# Patient Record
Sex: FEMALE | Race: WHITE | Employment: UNEMPLOYED | URBAN - METROPOLITAN AREA
[De-identification: names, ages, dates, MRNs, and addresses within clinical notes are randomized per-mention and may not be internally consistent; named-entity substitution may affect disease eponyms.]

---

## 2020-09-10 ENCOUNTER — APPOINTMENT (OUTPATIENT)
Dept: CT IMAGING | Age: 80
DRG: 280 | End: 2020-09-10
Payer: MEDICARE

## 2020-09-10 ENCOUNTER — HOSPITAL ENCOUNTER (INPATIENT)
Age: 80
LOS: 3 days | Discharge: HOSPICE | DRG: 280 | End: 2020-09-13
Attending: EMERGENCY MEDICINE | Admitting: STUDENT IN AN ORGANIZED HEALTH CARE EDUCATION/TRAINING PROGRAM
Payer: MEDICARE

## 2020-09-10 ENCOUNTER — APPOINTMENT (OUTPATIENT)
Dept: GENERAL RADIOLOGY | Age: 80
DRG: 280 | End: 2020-09-10
Payer: MEDICARE

## 2020-09-10 PROBLEM — K75.81 NASH (NONALCOHOLIC STEATOHEPATITIS): Status: ACTIVE | Noted: 2020-09-10

## 2020-09-10 PROBLEM — C50.919 METASTATIC BREAST CANCER (HCC): Status: ACTIVE | Noted: 2020-09-10

## 2020-09-10 PROBLEM — I21.4 NSTEMI (NON-ST ELEVATED MYOCARDIAL INFARCTION) (HCC): Status: ACTIVE | Noted: 2020-09-10

## 2020-09-10 PROBLEM — T68.XXXA HYPOTHERMIA: Status: ACTIVE | Noted: 2020-09-10

## 2020-09-10 PROBLEM — G93.40 ENCEPHALOPATHY: Status: ACTIVE | Noted: 2020-09-10

## 2020-09-10 PROBLEM — R23.8 SKIN BREAKDOWN: Status: ACTIVE | Noted: 2020-09-10

## 2020-09-10 PROBLEM — I10 ESSENTIAL HYPERTENSION: Chronic | Status: ACTIVE | Noted: 2020-09-10

## 2020-09-10 LAB
A/G RATIO: 0.5 (ref 1.1–2.2)
ALBUMIN SERPL-MCNC: 2.1 G/DL (ref 3.4–5)
ALP BLD-CCNC: 134 U/L (ref 40–129)
ALT SERPL-CCNC: 18 U/L (ref 10–40)
ANION GAP SERPL CALCULATED.3IONS-SCNC: 5 MMOL/L (ref 3–16)
APTT: 25.4 SEC (ref 24.2–36.2)
AST SERPL-CCNC: 45 U/L (ref 15–37)
BASOPHILS ABSOLUTE: 0 K/UL (ref 0–0.2)
BASOPHILS RELATIVE PERCENT: 0.3 %
BILIRUB SERPL-MCNC: 0.6 MG/DL (ref 0–1)
BILIRUBIN URINE: NEGATIVE
BLOOD, URINE: ABNORMAL
BUN BLDV-MCNC: 15 MG/DL (ref 7–20)
CALCIUM SERPL-MCNC: 8.3 MG/DL (ref 8.3–10.6)
CHLORIDE BLD-SCNC: 104 MMOL/L (ref 99–110)
CHP ED QC CHECK: YES
CLARITY: CLEAR
CO2: 31 MMOL/L (ref 21–32)
COLOR: YELLOW
COMMENT UA: ABNORMAL
CREAT SERPL-MCNC: 0.5 MG/DL (ref 0.6–1.2)
EOSINOPHILS ABSOLUTE: 0.1 K/UL (ref 0–0.6)
EOSINOPHILS RELATIVE PERCENT: 2.4 %
EPITHELIAL CELLS, UA: ABNORMAL /HPF (ref 0–5)
GFR AFRICAN AMERICAN: >60
GFR NON-AFRICAN AMERICAN: >60
GLOBULIN: 4.2 G/DL
GLUCOSE BLD-MCNC: 85 MG/DL (ref 70–99)
GLUCOSE BLD-MCNC: 89 MG/DL
GLUCOSE BLD-MCNC: 89 MG/DL (ref 70–99)
GLUCOSE URINE: NEGATIVE MG/DL
HCT VFR BLD CALC: 34.7 % (ref 36–48)
HEMOGLOBIN: 11.7 G/DL (ref 12–16)
KETONES, URINE: NEGATIVE MG/DL
LACTIC ACID: 1.5 MMOL/L (ref 0.4–2)
LEUKOCYTE ESTERASE, URINE: NEGATIVE
LYMPHOCYTES ABSOLUTE: 1.4 K/UL (ref 1–5.1)
LYMPHOCYTES RELATIVE PERCENT: 25.6 %
MCH RBC QN AUTO: 35.7 PG (ref 26–34)
MCHC RBC AUTO-ENTMCNC: 33.8 G/DL (ref 31–36)
MCV RBC AUTO: 105.7 FL (ref 80–100)
MICROSCOPIC EXAMINATION: YES
MONOCYTES ABSOLUTE: 0.6 K/UL (ref 0–1.3)
MONOCYTES RELATIVE PERCENT: 11 %
NEUTROPHILS ABSOLUTE: 3.4 K/UL (ref 1.7–7.7)
NEUTROPHILS RELATIVE PERCENT: 60.7 %
NITRITE, URINE: NEGATIVE
PDW BLD-RTO: 16.4 % (ref 12.4–15.4)
PERFORMED ON: NORMAL
PH UA: 5 (ref 5–8)
PLATELET # BLD: ABNORMAL K/UL (ref 135–450)
PLATELET SLIDE REVIEW: ABNORMAL
PMV BLD AUTO: ABNORMAL FL (ref 5–10.5)
POTASSIUM REFLEX MAGNESIUM: 4.1 MMOL/L (ref 3.5–5.1)
PROTEIN UA: NEGATIVE MG/DL
RBC # BLD: 3.28 M/UL (ref 4–5.2)
RBC UA: ABNORMAL /HPF (ref 0–4)
SODIUM BLD-SCNC: 140 MMOL/L (ref 136–145)
SPECIFIC GRAVITY UA: 1.02 (ref 1–1.03)
TOTAL PROTEIN: 6.3 G/DL (ref 6.4–8.2)
TROPONIN: 0.23 NG/ML
URINE REFLEX TO CULTURE: ABNORMAL
URINE TYPE: ABNORMAL
UROBILINOGEN, URINE: 0.2 E.U./DL
WBC # BLD: 5.6 K/UL (ref 4–11)
WBC UA: ABNORMAL /HPF (ref 0–5)

## 2020-09-10 PROCEDURE — 70496 CT ANGIOGRAPHY HEAD: CPT

## 2020-09-10 PROCEDURE — 70450 CT HEAD/BRAIN W/O DYE: CPT

## 2020-09-10 PROCEDURE — 85730 THROMBOPLASTIN TIME PARTIAL: CPT

## 2020-09-10 PROCEDURE — 6360000002 HC RX W HCPCS: Performed by: EMERGENCY MEDICINE

## 2020-09-10 PROCEDURE — 96375 TX/PRO/DX INJ NEW DRUG ADDON: CPT

## 2020-09-10 PROCEDURE — 93005 ELECTROCARDIOGRAM TRACING: CPT | Performed by: EMERGENCY MEDICINE

## 2020-09-10 PROCEDURE — 6370000000 HC RX 637 (ALT 250 FOR IP): Performed by: EMERGENCY MEDICINE

## 2020-09-10 PROCEDURE — 83605 ASSAY OF LACTIC ACID: CPT

## 2020-09-10 PROCEDURE — 71045 X-RAY EXAM CHEST 1 VIEW: CPT

## 2020-09-10 PROCEDURE — 36415 COLL VENOUS BLD VENIPUNCTURE: CPT

## 2020-09-10 PROCEDURE — 96366 THER/PROPH/DIAG IV INF ADDON: CPT

## 2020-09-10 PROCEDURE — 96365 THER/PROPH/DIAG IV INF INIT: CPT

## 2020-09-10 PROCEDURE — 84443 ASSAY THYROID STIM HORMONE: CPT

## 2020-09-10 PROCEDURE — 96374 THER/PROPH/DIAG INJ IV PUSH: CPT

## 2020-09-10 PROCEDURE — 81001 URINALYSIS AUTO W/SCOPE: CPT

## 2020-09-10 PROCEDURE — 2500000003 HC RX 250 WO HCPCS: Performed by: EMERGENCY MEDICINE

## 2020-09-10 PROCEDURE — 80053 COMPREHEN METABOLIC PANEL: CPT

## 2020-09-10 PROCEDURE — 6360000004 HC RX CONTRAST MEDICATION: Performed by: EMERGENCY MEDICINE

## 2020-09-10 PROCEDURE — 2580000003 HC RX 258: Performed by: EMERGENCY MEDICINE

## 2020-09-10 PROCEDURE — 99285 EMERGENCY DEPT VISIT HI MDM: CPT

## 2020-09-10 PROCEDURE — 1200000000 HC SEMI PRIVATE

## 2020-09-10 PROCEDURE — 84484 ASSAY OF TROPONIN QUANT: CPT

## 2020-09-10 PROCEDURE — 82140 ASSAY OF AMMONIA: CPT

## 2020-09-10 PROCEDURE — 87040 BLOOD CULTURE FOR BACTERIA: CPT

## 2020-09-10 PROCEDURE — 85025 COMPLETE CBC W/AUTO DIFF WBC: CPT

## 2020-09-10 RX ORDER — SODIUM CHLORIDE 0.9 % (FLUSH) 0.9 %
10 SYRINGE (ML) INJECTION PRN
Status: DISCONTINUED | OUTPATIENT
Start: 2020-09-10 | End: 2020-09-13 | Stop reason: HOSPADM

## 2020-09-10 RX ORDER — HEPARIN SODIUM 1000 [USP'U]/ML
60 INJECTION, SOLUTION INTRAVENOUS; SUBCUTANEOUS PRN
Status: DISCONTINUED | OUTPATIENT
Start: 2020-09-10 | End: 2020-09-10 | Stop reason: ALTCHOICE

## 2020-09-10 RX ORDER — HEPARIN SODIUM 1000 [USP'U]/ML
4000 INJECTION, SOLUTION INTRAVENOUS; SUBCUTANEOUS ONCE
Status: COMPLETED | OUTPATIENT
Start: 2020-09-10 | End: 2020-09-10

## 2020-09-10 RX ORDER — FOLIC ACID 1 MG/1
1 TABLET ORAL DAILY
Status: ON HOLD | COMMUNITY
End: 2020-09-13 | Stop reason: HOSPADM

## 2020-09-10 RX ORDER — 0.9 % SODIUM CHLORIDE 0.9 %
500 INTRAVENOUS SOLUTION INTRAVENOUS ONCE
Status: COMPLETED | OUTPATIENT
Start: 2020-09-10 | End: 2020-09-10

## 2020-09-10 RX ORDER — ASPIRIN 81 MG/1
324 TABLET, CHEWABLE ORAL ONCE
Status: COMPLETED | OUTPATIENT
Start: 2020-09-10 | End: 2020-09-10

## 2020-09-10 RX ORDER — PROMETHAZINE HYDROCHLORIDE 25 MG/1
12.5 TABLET ORAL EVERY 6 HOURS PRN
Status: DISCONTINUED | OUTPATIENT
Start: 2020-09-10 | End: 2020-09-13 | Stop reason: HOSPADM

## 2020-09-10 RX ORDER — LOSARTAN POTASSIUM 25 MG/1
25 TABLET ORAL DAILY
Status: DISCONTINUED | OUTPATIENT
Start: 2020-09-11 | End: 2020-09-13 | Stop reason: HOSPADM

## 2020-09-10 RX ORDER — HYDROCODONE BITARTRATE AND ACETAMINOPHEN 5; 325 MG/1; MG/1
1 TABLET ORAL ONCE
Status: COMPLETED | OUTPATIENT
Start: 2020-09-10 | End: 2020-09-10

## 2020-09-10 RX ORDER — SODIUM CHLORIDE 0.9 % (FLUSH) 0.9 %
10 SYRINGE (ML) INJECTION EVERY 12 HOURS SCHEDULED
Status: DISCONTINUED | OUTPATIENT
Start: 2020-09-10 | End: 2020-09-13 | Stop reason: HOSPADM

## 2020-09-10 RX ORDER — METOPROLOL TARTRATE 50 MG/1
50 TABLET, FILM COATED ORAL 2 TIMES DAILY
Status: DISCONTINUED | OUTPATIENT
Start: 2020-09-10 | End: 2020-09-11

## 2020-09-10 RX ORDER — FUROSEMIDE 40 MG/1
40 TABLET ORAL DAILY
Status: DISCONTINUED | OUTPATIENT
Start: 2020-09-11 | End: 2020-09-13

## 2020-09-10 RX ORDER — ACETAMINOPHEN 650 MG/1
650 SUPPOSITORY RECTAL EVERY 6 HOURS PRN
Status: DISCONTINUED | OUTPATIENT
Start: 2020-09-10 | End: 2020-09-13 | Stop reason: HOSPADM

## 2020-09-10 RX ORDER — CHOLECALCIFEROL (VITAMIN D3) 125 MCG
500 CAPSULE ORAL DAILY
Status: DISCONTINUED | OUTPATIENT
Start: 2020-09-11 | End: 2020-09-13 | Stop reason: HOSPADM

## 2020-09-10 RX ORDER — FOLIC ACID 1 MG/1
1 TABLET ORAL DAILY
Status: DISCONTINUED | OUTPATIENT
Start: 2020-09-11 | End: 2020-09-13 | Stop reason: HOSPADM

## 2020-09-10 RX ORDER — ATORVASTATIN CALCIUM 10 MG/1
10 TABLET, FILM COATED ORAL NIGHTLY
Status: ON HOLD | COMMUNITY
End: 2020-09-13 | Stop reason: HOSPADM

## 2020-09-10 RX ORDER — POLYETHYLENE GLYCOL 3350 17 G/17G
17 POWDER, FOR SOLUTION ORAL DAILY PRN
Status: DISCONTINUED | OUTPATIENT
Start: 2020-09-10 | End: 2020-09-13 | Stop reason: HOSPADM

## 2020-09-10 RX ORDER — ACETAMINOPHEN 325 MG/1
650 TABLET ORAL EVERY 6 HOURS PRN
Status: DISCONTINUED | OUTPATIENT
Start: 2020-09-10 | End: 2020-09-13 | Stop reason: HOSPADM

## 2020-09-10 RX ORDER — CHOLECALCIFEROL (VITAMIN D3) 125 MCG
500 CAPSULE ORAL DAILY
Status: ON HOLD | COMMUNITY
End: 2020-09-13 | Stop reason: HOSPADM

## 2020-09-10 RX ORDER — ONDANSETRON 2 MG/ML
4 INJECTION INTRAMUSCULAR; INTRAVENOUS EVERY 6 HOURS PRN
Status: DISCONTINUED | OUTPATIENT
Start: 2020-09-10 | End: 2020-09-13 | Stop reason: HOSPADM

## 2020-09-10 RX ORDER — HEPARIN SODIUM 1000 [USP'U]/ML
30 INJECTION, SOLUTION INTRAVENOUS; SUBCUTANEOUS PRN
Status: DISCONTINUED | OUTPATIENT
Start: 2020-09-10 | End: 2020-09-10 | Stop reason: ALTCHOICE

## 2020-09-10 RX ORDER — M-VIT,TX,IRON,MINS/CALC/FOLIC 27MG-0.4MG
1 TABLET ORAL DAILY
Status: DISCONTINUED | OUTPATIENT
Start: 2020-09-11 | End: 2020-09-13 | Stop reason: HOSPADM

## 2020-09-10 RX ORDER — HEPARIN SODIUM 10000 [USP'U]/100ML
2 INJECTION, SOLUTION INTRAVENOUS CONTINUOUS
Status: DISCONTINUED | OUTPATIENT
Start: 2020-09-10 | End: 2020-09-11

## 2020-09-10 RX ADMIN — IOPAMIDOL 75 ML: 755 INJECTION, SOLUTION INTRAVENOUS at 17:48

## 2020-09-10 RX ADMIN — SODIUM CHLORIDE 500 ML: 9 INJECTION, SOLUTION INTRAVENOUS at 16:26

## 2020-09-10 RX ADMIN — HEPARIN SODIUM 4000 UNITS: 1000 INJECTION INTRAVENOUS; SUBCUTANEOUS at 18:41

## 2020-09-10 RX ADMIN — HYDROCODONE BITARTRATE AND ACETAMINOPHEN 1 TABLET: 5; 325 TABLET ORAL at 17:31

## 2020-09-10 RX ADMIN — ASPIRIN 324 MG: 81 TABLET, CHEWABLE ORAL at 17:30

## 2020-09-10 RX ADMIN — SODIUM CHLORIDE 500 ML: 9 INJECTION, SOLUTION INTRAVENOUS at 19:03

## 2020-09-10 RX ADMIN — HEPARIN SODIUM 10 ML/HR: 10000 INJECTION, SOLUTION INTRAVENOUS at 18:42

## 2020-09-10 ASSESSMENT — PAIN DESCRIPTION - DESCRIPTORS
DESCRIPTORS: ACHING
DESCRIPTORS: ACHING

## 2020-09-10 ASSESSMENT — PAIN DESCRIPTION - LOCATION
LOCATION: ARM
LOCATION: ARM

## 2020-09-10 ASSESSMENT — PAIN DESCRIPTION - ORIENTATION
ORIENTATION: RIGHT
ORIENTATION: RIGHT

## 2020-09-10 ASSESSMENT — PAIN DESCRIPTION - ONSET: ONSET: ON-GOING

## 2020-09-10 ASSESSMENT — PAIN SCALES - GENERAL
PAINLEVEL_OUTOF10: 10

## 2020-09-10 ASSESSMENT — PAIN - FUNCTIONAL ASSESSMENT: PAIN_FUNCTIONAL_ASSESSMENT: PREVENTS OR INTERFERES SOME ACTIVE ACTIVITIES AND ADLS

## 2020-09-10 ASSESSMENT — PAIN DESCRIPTION - PAIN TYPE
TYPE: CHRONIC PAIN
TYPE: CHRONIC PAIN

## 2020-09-10 ASSESSMENT — PAIN DESCRIPTION - PROGRESSION: CLINICAL_PROGRESSION: NOT CHANGED

## 2020-09-10 ASSESSMENT — PAIN DESCRIPTION - FREQUENCY: FREQUENCY: CONTINUOUS

## 2020-09-10 NOTE — ED NOTES
Per MD, [id number] Maria Teresa dwyer administer norco and aspirin without swallow test.     Chad Murphy RN  09/10/20 9029

## 2020-09-10 NOTE — H&P
Surgical History:    No past surgical history on file. Medications Prior to Admission:    Prior to Admission medications    Medication Sig Start Date End Date Taking? Authorizing Provider   vitamin B-12 (CYANOCOBALAMIN) 500 MCG tablet Take 500 mcg by mouth daily   Yes Historical Provider, MD   atorvastatin (LIPITOR) 10 MG tablet Take 10 mg by mouth nightly   Yes Historical Provider, MD   folic acid (FOLVITE) 1 MG tablet Take 1 mg by mouth daily   Yes Historical Provider, MD   hydrocodone-acetaminophen (NORCO) 5-325 MG per tablet Take 1 tablet by mouth every 6 hours as needed. Yes Historical Provider, MD   POTASSIUM CHLORIDE PO Take 40 mEq by mouth daily    Yes Historical Provider, MD   gabapentin (NEURONTIN) 300 MG capsule Take 300 mg by mouth 2 times daily. Yes Historical Provider, MD   metoprolol (LOPRESSOR) 50 MG tablet Take 50 mg by mouth 2 times daily. Yes Historical Provider, MD   losartan (COZAAR) 25 MG tablet Take 25 mg by mouth daily. Yes Historical Provider, MD   zolpidem (AMBIEN) 10 MG tablet Take 10 mg by mouth nightly as needed. Yes Historical Provider, MD   famotidine (PEPCID) 20 MG tablet Take 20 mg by mouth daily    Yes Historical Provider, MD   furosemide (LASIX) 40 MG tablet Take 40 mg by mouth daily    Yes Historical Provider, MD   therapeutic multivitamin-minerals (THERAGRAN-M) tablet Take 1 tablet by mouth daily. Yes Historical Provider, MD   Polyethyl Glycol-Propyl Glycol (SYSTANE) 0.4-0.3 % SOLN Apply 2 drops to eye as needed    Yes Historical Provider, MD       Allergies:  Ace inhibitors; Amlodipine; and Diclofenac    Social History:  The patient currently lives at home. TOBACCO:   has no history on file for tobacco.  ETOH:   has no history on file for alcohol. Family History:  Reviewed in detail and negative for DM, Early CAD, Cancer, CVA. Positive as follows:    No family history on file. REVIEW OF SYSTEMS:   Positive for AMS and as noted in the HPI.  All other systems reviewed and negative. PHYSICAL EXAM:    /66   Pulse 77   Temp 95.9 °F (35.5 °C) (Axillary) Comment: Provider Notified  Resp 20   Ht 5' 3\" (1.6 m)   Wt 215 lb 9.8 oz (97.8 kg)   SpO2 97%   BMI 38.19 kg/m²     General appearance: No apparent distress appears stated age and cooperative. HEENT Normal cephalic, atraumatic without obvious deformity. Pupils equal, round, and reactive to light. Extra ocular muscles intact. Conjunctivae/corneas clear. Neck: Supple, No jugular venous distention/bruits. Trachea midline without thyromegaly or adenopathy with full range of motion. Lungs: Clear to auscultation, bilaterally without Rales/Wheezes/Rhonchi with good respiratory effort. Heart: Regular rate and rhythm with Normal S1/S2 without murmurs, rubs or gallops, point of maximum impulse non-displaced  Abdomen: Large with pannus soft, non-tender or non-distended without rigidity or guarding and positive bowel sounds all four quadrants. There is evidence of an erythematous moist patchy blanchable skin excoriated rash under both breasts and around the lower abdominal pannus fold and within the groin bilaterally. Extremities: Right upper extremity edema extending down towards the forearm. Mild left upper extremity edema also noted. Bilateral lower extremity pedal edema and mild erythema of both ankles and lower legs. The discoloration of the lower legs is chronic per the patient's aide. She weakly moves the upper extremities. And she is able to perform bilateral plantar flexion dorsiflexion. The aide states that the patient has not been able to raise her legs independently for as long as she is known her. No clubbing, cyanosis, or edema bilaterally. Full range of motion without deformity and normal gait intact. Skin: Skin color, texture, turgor normal.  Rash under both breasts, abdominal pannus and skin breakdown stage II with excoriation the buttocks coccyx area.    Neurologic: Alert and oriented to person and place. No facial drooping., neurovascularly intact with sensory/motor intact upper extremities/lower extremities, bilaterally. Cranial nerves: II-XII intact, grossly non-focal.  Mental status: Alert, oriented, oriented to person. Poor historian otherwise. Capillary Refill: Acceptable  < 3 seconds  Peripheral Pulses: +3 Easily felt, not easily obliterated with pressure             CXR:  I have reviewed the CXR with the following interpretation: Perihilar and left basilar opacification, likely atelectasis  EKG:  I have reviewed the EKG with the following interpretation: Sinus rhythm first-degree block, Q waves present: Lead III, aVF, V3 through V5. Rate 83, AK interval 260, QRS 8 4,     CBC   Recent Labs     09/10/20  1614   WBC 5.6   HGB 11.7*   HCT 34.7*   PLT see below      RENAL  Recent Labs     09/10/20  1614      K 4.1      CO2 31   BUN 15   CREATININE 0.5*     LFT'S  Recent Labs     09/10/20  1614   AST 45*   ALT 18   BILITOT 0.6   ALKPHOS 134*     COAG  No results for input(s): INR in the last 72 hours. CARDIAC ENZYMES  Recent Labs     09/10/20  1614   TROPONINI 0.23*       U/A:    Lab Results   Component Value Date    COLORU Yellow 09/10/2020    WBCUA 3-5 09/10/2020    RBCUA 11-20 09/10/2020    BACTERIA 2+ 12/30/2010    CLARITYU Clear 09/10/2020    SPECGRAV 1.025 09/10/2020    LEUKOCYTESUR Negative 09/10/2020    BLOODU LARGE 09/10/2020    GLUCOSEU Negative 09/10/2020    GLUCOSEU NEGATIVE 12/30/2010       ABG  No results found for: PIM1GWF, BEART, R0JNCDNR, PHART, THGBART, IGP5VSV, PO2ART, FKM8DUH        Active Hospital Problems    Diagnosis Date Noted    NSTEMI (non-ST elevated myocardial infarction) ([facility] [address] [I21.4] 09/10/2020     Priority: High    [facility] [C50.919] 09/10/2020     Priority: High    Encephalopathy [G93.40] 09/10/2020     Priority: High    Hypothermia [T68. XXXA] 09/10/2020     Priority: High    Skin breakdown [L90.9] 09/10/2020     Priority: High    PUENTE (nonalcoholic steatohepatitis) [K75.81] 09/10/2020     Priority: Low    Essential hypertension [I10] 09/10/2020     Priority: Low         PHYSICIANS CERTIFICATION:    I certify that Drew Del Rio is expected to be hospitalized for more than 2 midnights based on the following assessment and plan:      ASSESSMENT/PLAN:    Patient's level of care sounds as if it is exceeding the resources of the current home health care aides. Patient's children need to be contacted again and another conversation needs to occur regarding the ongoing care and management. [facility] hospital providers note that they discussed hospice options and the son wanted to take the patient home. Encephalopathy: Medication related, metastatic/toxic related, elevated ammonia, hypothyroid  Hold gabapentin, Ambien and Norco  CT head neck negative for evidence of intracranial hemorrhage or stroke, lymphadenopathy noted consistent with recent prior studies  TSH ordered  Ammonia ordered  Lactic acid 1.5  WBC: 5.6  Anion gap 5, CO2 31    NSTEMI: Troponin 0 0.23, will trend                  Heparin drip                  Aspirin                  Consult cardiology    Hypothermia: ED temperature 95.9,                         Repeat rectal temperatures with bear hugger in place:                                     95.5-95.9                         Continue warming blanket, and monitor                         TSH in process                         No evidence of sepsis    Metastatic breast cancer: We will need to determine if the patient actually does not want any treatment or biopsy. Notes from [facility] indicate that she did not want  A biopsy or treatment.   8/24/2020: CT of the chest indicating a right axillary mass with pulmonary nodules consistent with Metastatic neoplastic disease  8/25/2020: Right upper extremity venous Doppler study: Negative  8/25/2020: CT the abdomen and pelvis negative for any evidence of metastatic process. 8/25/2020: MRI brain negative for evidence of metastatic process or mass. No evidence of stroke    Rash: Likely yeast             Nystatin powder and intra-dry to the areas under the breast and the                   Groin             Buttocks: Consult wound care    Hypertension: Stable,                            Continue Lasix, losartan, metoprolol    PUENTE: Stable: Bili 0.6, alk phos 134 and AST slightly elevated 45      DVT Prophylaxis: Lovenox  Diet: DIET CLEAR LIQUID;  Code Status: Full Code  PT/OT Eval Status: Nonambulatory, completely dependent, consults ordered    Dispo -admitted       [id number] [facility], APRN - CNP    Thank you Rhiannon Pearson for the opportunity to be involved in this patient's care. If you have any questions or concerns please feel free to contact me at [phone].

## 2020-09-10 NOTE — ED NOTES
Per Dr. Dionicio Lovett, we will start Heparin.  Pharmacy updated     Colby Hunter RN  09/10/20 [id number]

## 2020-09-10 NOTE — PROGRESS NOTES
Medication Reconciliation    List of medications patient is currently taking is complete. Source of information: 1. Conversation with patient and family at bedside                                      2. EPIC records      Allergies  Ace inhibitors; Amlodipine; and Diclofenac     Notes regarding home medications:   1. Patient did not receive any of her home medications prior to arrival to the emergency department.

## 2020-09-10 NOTE — ED NOTES
Per Dr. Marica Lennox, hold heparin until CT is resulted     [facility] Jim, BACILIO  09/10/20 [phone]

## 2020-09-10 NOTE — CONSULTS
Clinical Pharmacy Note  Heparin Dosing Consult    Chung Cabrera is a [de-identified] y.o. female ordered heparin per low dose nomogram by Dr. Candelario Maloney (ED). Lab Results   Component Value Date    APTT 31.2 06/10/2010     Lab Results   Component Value Date    HGB 11.7 09/10/2020    HCT 34.7 09/10/2020    PLT see below 09/10/2020    INR 1.19 06/10/2010       Ht Readings from Last 1 Encounters:   09/10/20 5' 3\" (1.6 m)        Wt Readings from Last 1 Encounters:   09/10/20 215 lb 9.8 oz (97.8 kg)     Dosing weight: 98 kg    Assessment/Plan:  Initial bolus: 4000 units  Initial infusion rate: 10 mL/hr  Next aPTT: 9/11/20 0100    Pharmacy will continue to monitor adjust heparin based on aPTT results using nomogram below:     LOW DOSE HEPARIN PROTOCOL (ACS/STEMI/A FIB)     Initial Bolus: 60 units/kg Max Bolus: 4,000 units       Initial Rate: 12 units/kg/hr Max Initial Rate: 1,000 units/hr     aPTT < 36   Heparin 60 units/kg bolus Increase infusion by 4 units/kg/hr       (maximum 4,000 units)   aPTT 37-48   Heparin 30 units/kg bolus Increase infusion by 2 units/kg/hr       (maximum 2,000 units)   aPTT 49-76   No bolus   No change   aPTT 77-85   No bolus   Decrease infusion by 2 units/kg/hr   aPTT 86-94   Hold heparin for 1 hour Decrease infusion by 3 units/kg/hr   aPTT     Hold heparin for 1 hour Decrease infusion by 4 units/kg/hr   aPTT > 103   Hold heparin for 1 hour Decrease infusion by 6 units/kg/hr    Obtain aPTT 6 hours after initial bolus and 6 hours after any dose change until two consecutive therapeutic aPTTs are achieved - then daily.    RAMANDEEP Sanderson [facility]  9/10/2020  6:35 PM

## 2020-09-10 NOTE — ED TRIAGE NOTES
Patient arrives to ED via EMS for altered mental status, weakness and altered speech. Per EMS patient's caregivers state that the patient's speech \"sounded off' this morning and patient was unable to stand as normal. Per EMS, patient's speech improved after having her dentures put back in place on their arrival. On arrival to ED, patient is A&Ox4. No obvious speech deficits present. Per Caregiver, patient's speech sounds normal at this time. Reports hx of cancer. Patient's temperature found to be 94.9 rectal. SPO2 is 92 on room air. Placed on 2 lpm NC per MD verbal order at bedside. MD at bedside on arrival to ED. Patient is resting in bed. Respirations even and easy at this time. Multiple pressure sores present on patient's buttocks and coccygeal area.

## 2020-09-10 NOTE — ED PROVIDER NOTES
Substance and Sexual Activity    Alcohol use: Not on file    Drug use: Not on file    Sexual activity: Not on file   Lifestyle    Physical activity     Days per week: Not on file     Minutes per session: Not on file    Stress: Not on file   Relationships    Social connections     Talks on phone: Not on file     Gets together: Not on file     Attends Hoahaoism service: Not on file     Active member of club or organization: Not on file     Attends meetings of clubs or organizations: Not on file     Relationship status: Not on file    Intimate partner violence     Fear of current or ex partner: Not on file     Emotionally abused: Not on file     Physically abused: Not on file     Forced sexual activity: Not on file   Other Topics Concern    Not on file   Social History Narrative    Not on file     Current Facility-Administered Medications   Medication Dose Route Frequency Provider Last Rate Last Dose    heparin 25,000 unit in sodium chloride 0.45% 250 mL infusion  10 mL/hr Intravenous Continuous Jana Box MD 10 mL/hr at 09/10/20 1842 10 mL/hr at 09/10/20 1842    miconazole (MICOTIN) 2 % powder   Topical BID Vannessa Christine APRN - CNP         Allergies   Allergen Reactions    Ace Inhibitors     Amlodipine     Diclofenac          REVIEW OF SYSTEMS  10 systems reviewed, pertinent positives per HPI otherwise noted to be negative    PHYSICAL EXAM  /66   Pulse 77   Temp 95.9 °F (35.5 °C) (Axillary) Comment: Provider Notified  Resp 20   Ht 5' 3\" (1.6 m)   Wt 215 lb 9.8 oz (97.8 kg)   SpO2 97%   BMI 38.19 kg/m²      CONSTITUTIONAL: AOx4, cooperative with exam, afebrile   HEAD: normocephalic, atraumatic   EYES: PERRL, EOMI, anicteric sclera   ENT: Moist mucous membranes, uvula midline   NECK: Supple, symmetric, trachea midline   LUNGS: Bilateral breath sounds, CTAB, no rales/ronchi/wheezes   CARDIOVASCULAR: RRR, normal S1/S2, no m/r/g, 2+ pulses throughout   ABDOMEN: Soft, non-tender, non-distended, +BS   NEUROLOGIC:  MAEx4, 5/5 strength throughout; fine touch sensation intact throughout; GCS 15, cranial nerves II through XII intact, no facial droop, no dysarthria, no aphasia, negative test of skew   MUSCULOSKELETAL: No clubbing, cyanosis or edema   SKIN: No rash, pallor or wounds on exposed surfaces     INITIAL NIH STROKE SCALE    Time Performed:  0488    Administer stroke scale items in the order listed. Record performance in each category after each subscale exam. Do not go back and change scores. Follow directions provided for each exam technique. Scores should reflect what the patient does, not what the clinician thinks the patient can do. The clinician should record answers while administering the exam and work quickly. Except where indicated, the patient should not be coached (i.e., repeated requests to patient to make a special effort). 1a.  Level of consciousness:  0 - alert; keenly responsive  1b. Level of consciousness questions:  0 - answers both questions correctly  1c. Level of consciousness questions:  0 - performs both tasks correctly  2. Best Gaze:  0 - normal  3. Visual:  0 - no visual loss  4. Facial Palsy:  0 - normal symmetric movement  5a. Motor left arm:  0 - no drift, limb holds 90 (or 45) degrees for full 10 seconds  5b. Motor right arm:  0 - no drift, limb holds 90 (or 45) degrees for full 10 seconds  6a. Motor left le - some effort against gravity; leg falls to bed by 5 seconds but has some effort against gravity  6b. Motor right le - some effort against gravity; leg falls to bed by 5 seconds but has some effort against gravity  7. Limb Ataxia:  0 - absent  8. Sensory:  0 - normal; no sensory loss  9. Best Language:  0 - no aphasia, normal  10. Dysarthria:  0 - normal  11. Extinction and Inattention:  0 - no abnormality    TOTAL:  4        RADIOLOGY  X-RAYS:  I have reviewed radiologic plain film image(s).   ALL OTHER NON-PLAIN FILM IMAGES SUCH AS CT, ULTRASOUND AND MRI HAVE BEEN READ BY THE RADIOLOGIST. CT HEAD WO CONTRAST   Preliminary Result   1. Malignant right axillary lymph nodes likely from metastatic breast cancer. 2.  No large vessel occlusion in the head or neck. 3. No acute intracranial abnormality. CTA HEAD NECK W CONTRAST   Preliminary Result   1. Malignant right axillary lymph nodes likely from metastatic breast cancer. 2.  No large vessel occlusion in the head or neck. 3. No acute intracranial abnormality. XR CHEST PORTABLE   Final Result   Right perihilar and left basilar opacification, likely atelectasis. Cardiomegaly without overt failure. EKG INTERPRETATION  Normal sinus rhythm with first-degree AV block and a rate of 83, normal axis, , QRS 84, QTc 441, no ST elevations, poor R wave progression, nonspecific ST changes, no acute change compared EKG from 6/10/2010    PROCEDURES    ED COURSE/MDM  TIA, hypoglycemia, dehydration, UTI, ACS/MI, pneumonia, sepsis    Patient seen and evaluated. History and physical as above. Patient nontoxic but does arrive hypothermic at 94.9 °F.  Patient was covered in multiple warm blankets and bear hugger. Septic orders initiated secondary to hypothermia. Patient also placed on 2 L nasal cannula O2 secondary to O2 saturation at 90 on arrival on room air. No home oxygen use. Does have faint crackles in the lung bases bilaterally. No aphasia or focal deficits. Patient does have general weakness in her lower extremities although no sensory deficits. Suspicion is more for a metabolic cause of patient's symptoms as opposed to a stroke. We will still obtain CT head and CTA head and neck and speak with the stroke team.  Also cardiac labs, urinalysis, septic labs, IV fluids and plan for admission. ED Course as of Sep 10 2310   Thu Sep 10, 2020   1523 Patient arrives alert and oriented x4.   NIH stroke scale performed which was 4 for patient having some effort against gravity in her lower extremities but her legs do fall to bed. Suspicion is that this general weakness is not from a stroke but more from patient deconditioning. Normal sensation throughout. Will proceed with CT head, CTA head and neck. Consult placed to stroke team for possible dysarthria this morning. Patient also hypothermic on arrival at 94.9 °F.  Warm blankets placed on patient. O2 saturation 90% on room air and therefore 2 L nasal cannula placed. [DS]   3396 Spoke with Dr. Pavel Resendiz, stroke team, discussed patient's care plan and possible dysarthria/aphasia this morning. Discussed that patient is speaking normally currently and has no focal deficit except for general weakness in her lower extremities. He recommends continuing with imaging but no acute intervention at this time secondary to low symptom load. [DS]   1557 Patient found to have troponin of 0.23. Creatinine less than 0.5. EKG without any ST elevations. Patient lab work and symptoms consistent with NSTEMI. Patient treated with full dose aspirin. Awaiting result of patient CT head to ensure no intracranial hemorrhage prior to starting heparin. [DS]   1904 CT head and CTA head and neck unremarkable. Patient started on low-dose heparin for NSTEMI. Consult placed to hospitalist for admission. [DS]      ED Course User Index  [DS] Lizandro Santana MD     CRITICAL CARE TIME   Total Critical Care time was 38 minutes, excluding separately reportable procedures. There was a high probability of clinically significant/life threatening deterioration in the patient's condition which required my urgent intervention. Patient was given scripts for the following medications. I counseled patient how to take these medications. Current Discharge Medication List            CLINICAL IMPRESSION  1. NSTEMI (non-ST elevated myocardial infarction) ([facility] [address]    2. Generalized weakness    3. Difficulty walking    4. Hypothermia, initial encounter    5. Pressure injury of sacral region, stage 2 (HCC)        Blood pressure 100/66, pulse 77, temperature 95.9 °F (35.5 °C), temperature source Axillary, resp. rate 20, height 5' 3\" (1.6 m), weight 215 lb 9.8 oz (97.8 kg), SpO2 97 %. DISPOSITION  Admitted    Disclaimer: All medical record entries made by [address] [place] [address] [address] [facility].       (Please note that this note was completed with a voice recognition program. Every attempt was made to edit the dictations, but inevitably there remain words that are mis-transcribed.)            Ashlee Vásquez MD  09/10/20 8571

## 2020-09-11 LAB
ALBUMIN SERPL-MCNC: 1.9 G/DL (ref 3.4–5)
ALP BLD-CCNC: 132 U/L (ref 40–129)
ALT SERPL-CCNC: 17 U/L (ref 10–40)
AMMONIA: 34 UMOL/L (ref 11–51)
ANION GAP SERPL CALCULATED.3IONS-SCNC: 7 MMOL/L (ref 3–16)
APTT: 94.1 SEC (ref 24.2–36.2)
APTT: >248 SEC (ref 24.2–36.2)
APTT: >248 SEC (ref 24.2–36.2)
AST SERPL-CCNC: 44 U/L (ref 15–37)
BILIRUB SERPL-MCNC: 0.6 MG/DL (ref 0–1)
BILIRUBIN DIRECT: <0.2 MG/DL (ref 0–0.3)
BILIRUBIN, INDIRECT: ABNORMAL MG/DL (ref 0–1)
BUN BLDV-MCNC: 14 MG/DL (ref 7–20)
CALCIUM SERPL-MCNC: 8.1 MG/DL (ref 8.3–10.6)
CHLORIDE BLD-SCNC: 101 MMOL/L (ref 99–110)
CO2: 29 MMOL/L (ref 21–32)
CREAT SERPL-MCNC: <0.5 MG/DL (ref 0.6–1.2)
EKG ATRIAL RATE: 76 BPM
EKG ATRIAL RATE: 83 BPM
EKG DIAGNOSIS: NORMAL
EKG DIAGNOSIS: NORMAL
EKG P AXIS: 24 DEGREES
EKG P AXIS: 28 DEGREES
EKG P-R INTERVAL: 250 MS
EKG P-R INTERVAL: 260 MS
EKG Q-T INTERVAL: 376 MS
EKG Q-T INTERVAL: 388 MS
EKG QRS DURATION: 84 MS
EKG QRS DURATION: 86 MS
EKG QTC CALCULATION (BAZETT): 436 MS
EKG QTC CALCULATION (BAZETT): 441 MS
EKG R AXIS: -20 DEGREES
EKG R AXIS: -22 DEGREES
EKG T AXIS: 54 DEGREES
EKG T AXIS: 67 DEGREES
EKG VENTRICULAR RATE: 76 BPM
EKG VENTRICULAR RATE: 83 BPM
GFR AFRICAN AMERICAN: >60
GFR NON-AFRICAN AMERICAN: >60
GLUCOSE BLD-MCNC: 83 MG/DL (ref 70–99)
HCT VFR BLD CALC: 32.7 % (ref 36–48)
HEMOGLOBIN: 11.1 G/DL (ref 12–16)
MCH RBC QN AUTO: 35.5 PG (ref 26–34)
MCHC RBC AUTO-ENTMCNC: 34 G/DL (ref 31–36)
MCV RBC AUTO: 104.6 FL (ref 80–100)
PDW BLD-RTO: 16.2 % (ref 12.4–15.4)
PLATELET # BLD: ABNORMAL K/UL (ref 135–450)
PLATELET SLIDE REVIEW: ABNORMAL
PMV BLD AUTO: ABNORMAL FL (ref 5–10.5)
POTASSIUM REFLEX MAGNESIUM: 3.9 MMOL/L (ref 3.5–5.1)
RBC # BLD: 3.13 M/UL (ref 4–5.2)
SLIDE REVIEW: ABNORMAL
SODIUM BLD-SCNC: 137 MMOL/L (ref 136–145)
TOTAL CK: 101 U/L (ref 26–192)
TOTAL CK: 43 U/L (ref 26–192)
TOTAL PROTEIN: 6.2 G/DL (ref 6.4–8.2)
TROPONIN: 0.15 NG/ML
TROPONIN: 0.21 NG/ML
TROPONIN: 0.21 NG/ML
TROPONIN: 0.23 NG/ML
TROPONIN: 0.24 NG/ML
TROPONIN: 0.24 NG/ML
TSH REFLEX: 2.31 UIU/ML (ref 0.27–4.2)
WBC # BLD: 4.5 K/UL (ref 4–11)

## 2020-09-11 PROCEDURE — 97530 THERAPEUTIC ACTIVITIES: CPT

## 2020-09-11 PROCEDURE — 96366 THER/PROPH/DIAG IV INF ADDON: CPT

## 2020-09-11 PROCEDURE — 85730 THROMBOPLASTIN TIME PARTIAL: CPT

## 2020-09-11 PROCEDURE — 2500000003 HC RX 250 WO HCPCS: Performed by: EMERGENCY MEDICINE

## 2020-09-11 PROCEDURE — 6370000000 HC RX 637 (ALT 250 FOR IP): Performed by: NURSE PRACTITIONER

## 2020-09-11 PROCEDURE — 36415 COLL VENOUS BLD VENIPUNCTURE: CPT

## 2020-09-11 PROCEDURE — 80048 BASIC METABOLIC PNL TOTAL CA: CPT

## 2020-09-11 PROCEDURE — 2700000000 HC OXYGEN THERAPY PER DAY

## 2020-09-11 PROCEDURE — 80076 HEPATIC FUNCTION PANEL: CPT

## 2020-09-11 PROCEDURE — 2580000003 HC RX 258: Performed by: NURSE PRACTITIONER

## 2020-09-11 PROCEDURE — 99222 1ST HOSP IP/OBS MODERATE 55: CPT | Performed by: INTERNAL MEDICINE

## 2020-09-11 PROCEDURE — 93010 ELECTROCARDIOGRAM REPORT: CPT | Performed by: INTERNAL MEDICINE

## 2020-09-11 PROCEDURE — 2500000003 HC RX 250 WO HCPCS: Performed by: NURSE PRACTITIONER

## 2020-09-11 PROCEDURE — 85027 COMPLETE CBC AUTOMATED: CPT

## 2020-09-11 PROCEDURE — 82550 ASSAY OF CK (CPK): CPT

## 2020-09-11 PROCEDURE — 6370000000 HC RX 637 (ALT 250 FOR IP): Performed by: INTERNAL MEDICINE

## 2020-09-11 PROCEDURE — 97166 OT EVAL MOD COMPLEX 45 MIN: CPT

## 2020-09-11 PROCEDURE — 84484 ASSAY OF TROPONIN QUANT: CPT

## 2020-09-11 PROCEDURE — 93005 ELECTROCARDIOGRAM TRACING: CPT | Performed by: NURSE PRACTITIONER

## 2020-09-11 PROCEDURE — 94761 N-INVAS EAR/PLS OXIMETRY MLT: CPT

## 2020-09-11 PROCEDURE — 97162 PT EVAL MOD COMPLEX 30 MIN: CPT

## 2020-09-11 PROCEDURE — 1200000000 HC SEMI PRIVATE

## 2020-09-11 PROCEDURE — 97110 THERAPEUTIC EXERCISES: CPT

## 2020-09-11 RX ORDER — TRAMADOL HYDROCHLORIDE 50 MG/1
50 TABLET ORAL EVERY 6 HOURS PRN
Status: DISCONTINUED | OUTPATIENT
Start: 2020-09-11 | End: 2020-09-13 | Stop reason: HOSPADM

## 2020-09-11 RX ORDER — HYDROCODONE BITARTRATE AND ACETAMINOPHEN 5; 325 MG/1; MG/1
1 TABLET ORAL EVERY 6 HOURS PRN
Status: DISCONTINUED | OUTPATIENT
Start: 2020-09-11 | End: 2020-09-11

## 2020-09-11 RX ORDER — ASPIRIN 81 MG/1
81 TABLET, CHEWABLE ORAL DAILY
Status: DISCONTINUED | OUTPATIENT
Start: 2020-09-11 | End: 2020-09-13 | Stop reason: HOSPADM

## 2020-09-11 RX ADMIN — ACETAMINOPHEN 650 MG: 325 TABLET ORAL at 17:19

## 2020-09-11 RX ADMIN — ASPIRIN 81 MG: 81 TABLET, CHEWABLE ORAL at 17:19

## 2020-09-11 RX ADMIN — MICONAZOLE NITRATE: 20 POWDER TOPICAL at 22:36

## 2020-09-11 RX ADMIN — Medication 10 ML: at 09:59

## 2020-09-11 RX ADMIN — HEPARIN SODIUM 4.1 ML/HR: 10000 INJECTION, SOLUTION INTRAVENOUS at 06:57

## 2020-09-11 RX ADMIN — MICONAZOLE NITRATE: 20 POWDER TOPICAL at 09:59

## 2020-09-11 RX ADMIN — FOLIC ACID 1 MG: 1 TABLET ORAL at 09:58

## 2020-09-11 RX ADMIN — MICONAZOLE NITRATE: 20 POWDER TOPICAL at 00:37

## 2020-09-11 RX ADMIN — CYANOCOBALAMIN TAB 500 MCG 500 MCG: 500 TAB at 09:58

## 2020-09-11 RX ADMIN — ACETAMINOPHEN 650 MG: 325 TABLET ORAL at 09:58

## 2020-09-11 RX ADMIN — FUROSEMIDE 40 MG: 40 TABLET ORAL at 09:58

## 2020-09-11 RX ADMIN — LOSARTAN POTASSIUM 25 MG: 25 TABLET, FILM COATED ORAL at 09:59

## 2020-09-11 RX ADMIN — Medication 10 ML: at 22:40

## 2020-09-11 RX ADMIN — TRAMADOL HYDROCHLORIDE 50 MG: 50 TABLET, FILM COATED ORAL at 22:36

## 2020-09-11 RX ADMIN — HYDROCODONE BITARTRATE AND ACETAMINOPHEN 1 TABLET: 5; 325 TABLET ORAL at 11:40

## 2020-09-11 RX ADMIN — MULTIPLE VITAMINS W/ MINERALS TAB 1 TABLET: TAB at 09:58

## 2020-09-11 RX ADMIN — METOPROLOL TARTRATE 50 MG: 50 TABLET, FILM COATED ORAL at 09:59

## 2020-09-11 ASSESSMENT — PAIN DESCRIPTION - DESCRIPTORS
DESCRIPTORS: ACHING;DISCOMFORT

## 2020-09-11 ASSESSMENT — PAIN SCALES - GENERAL
PAINLEVEL_OUTOF10: 2
PAINLEVEL_OUTOF10: 3
PAINLEVEL_OUTOF10: 5
PAINLEVEL_OUTOF10: 0
PAINLEVEL_OUTOF10: 10
PAINLEVEL_OUTOF10: 10

## 2020-09-11 ASSESSMENT — PAIN DESCRIPTION - PAIN TYPE
TYPE: CHRONIC PAIN

## 2020-09-11 ASSESSMENT — PAIN DESCRIPTION - LOCATION
LOCATION: GENERALIZED

## 2020-09-11 ASSESSMENT — PAIN DESCRIPTION - FREQUENCY: FREQUENCY: CONTINUOUS

## 2020-09-11 ASSESSMENT — PAIN DESCRIPTION - PROGRESSION: CLINICAL_PROGRESSION: GRADUALLY WORSENING

## 2020-09-11 ASSESSMENT — PAIN DESCRIPTION - ONSET: ONSET: ON-GOING

## 2020-09-11 NOTE — CARE COORDINATION
INITIAL CASE MANAGEMENT ASSESSMENT    Reviewed chart, met with patient to assess possible discharge needs. Explained Case Management role/services. Living Situation: Patient lives alone in a first floor apartment. ADLs: Dependent. Non-ambulatory at baseline and reports she has not been able to stand on her feet to do a pivot tansfer in 3-4 months. DME: 4 wheeled walker, Hernando Shoemaker, Lift chair, Hospital bed, Alert Button, Reacher    PT/OT Recs:    Discharge Recommendations:  Continue to assess pending progress   PT Equipment Recommendations    Other: will monitorToday, the pt presents in 10/10 pain in various parts of her body, she needed encouragement to get out of bed; she was dependent for rolling for a maxi-LIFT pad placement and dependent for transfer to the chair. Discussed d/c plans with the pt and she still reports she wants to go home. The pt does not appear to be functioning at a safe level for her to return home to her current situation; she may benefit from con't skilled PT if she is willing to participate. Will con't to follow this pt on a trial basis; if she is unable to participate or improve then will d/c from PT services. Active Services: Patient has [address] 7 days a week, several times a day, from [facility] [phone]  Through a [employer],  is Michael Cantu [phone]. Transportation: Ambulance/stretcher     Medications: [address] [place] [address]    PCP: Jignesh Dobson      HD/PD: n/a    PLAN/COMMENTS: Palliative Care and Hospice consult. Pt is a DNR-CC. She wishes to return to home with Hospice and continue her [employer] caregivers. SW/CM provided contact information for patient or family to call with any questions. SW/CM will follow and assist as needed. Electronically signed by Eileen Yates RN on 9/11/2020 at 3:15 PM

## 2020-09-11 NOTE — PROGRESS NOTES
Occupational Therapy   Occupational Therapy Initial Assessment  Date: 2020   Patient Name: Alessandro Cronin  MRN: [id number]     : 1940    Date of Service: 2020    Discharge Recommendations:  3-5 sessions per week, Patient would benefit from continued therapy after discharge      Alessandro Cronin scored a 10/24 on the AM-PAC ADL Inpatient form. Current research shows that an AM-PAC score of 17 or less is typically not associated with a discharge to the patient's home setting. Based on the patient's AM-PAC score and their current ADL deficits, it is recommended that the patient have 3-5 sessions per week of Occupational Therapy at d/c to increase the patient's independence. Please see assessment section for further patient specific details. If patient discharges prior to next session this note will serve as a discharge summary. Please see below for the latest assessment towards goals. Assessment   Performance deficits / Impairments: Decreased functional mobility ; Decreased ADL status; Decreased ROM; Decreased strength;Decreased endurance;Decreased balance;Decreased cognition;Decreased safe awareness;Decreased fine motor control  Assessment: Pt is an [de-identified] y.o. female admitted with encephalopathy, NSTEMI, hypothermia and rash on buttocks. At baseline, The pt reports she lives alone but has 2 HHA that come 3x/day, 7 days/week that assist her with all self-care and do all homemaking chores. The pt reports she has been non-ambulatory for a while and unable to do a pivot transfer in 3-4 months. PMHx: breast Ca w/mets, PUENTE, liver cirrhosis, HTN. Pt presents with significant deficits in self-care d/t the above deficits, today requiring use of lift equipment (maximove) for fxl transfers, set-up/SBA for feeding, and anticipate pt would require max A/total A for ADLs. Discussed d/c plans with pt who reports she plans to return home.  At this time, pt not functioning at a safe level for return home, and would benefit from ongoing skilled OT at d/c if she is willing to participate. Will cont to follow while hospitalized for trial of OT services. Prognosis: Fair;Guarded  Decision Making: Medium Complexity  History: see above  Exam: decreased ADL status, strength/ROM BUE's, cognition, endurance, fxl transfers, balance  Assistance / Modification: max A/total A forADLs  OT Education: OT Role;Plan of Care;ADL Adaptive Strategies  Patient Education: d/c recommendations  Barriers to Learning: cognition  REQUIRES OT FOLLOW UP: Yes  Activity Tolerance  Activity Tolerance: Patient Tolerated treatment well;Patient limited by fatigue  Safety Devices  Safety Devices in place: Yes  Type of devices: Call light within reach; Chair alarm in place; Left in chair;Nurse notified           Patient Diagnosis(es): The primary encounter diagnosis was NSTEMI (non-ST elevated myocardial infarction) ([facility] Diagnoses of Generalized weakness, Difficulty walking, Hypothermia, initial encounter, and Pressure injury of sacral region, stage 2 ([facility] were also pertinent to this visit. has a past medical history of Cancer ([facility] has no past surgical history on file. Restrictions  Restrictions/Precautions  Restrictions/Precautions: Fall Risk  Position Activity Restriction  Other position/activity restrictions: 2 L O2, IV, pure-wick    Subjective   General  Chart Reviewed: Yes  Additional Pertinent Hx: Per H&P on 9- of HORACIO Owen CNP: The pt is an [de-identified] yo female who was brought to the ED per her HHA due to increased weakness. The pt was at [facility] last month with altered MS. The pt being treated for encephalopathy, NSTEMI, hypothermia and rash on buttocks. The pt also known to have metastatic breast Ca with with R axillary mass seen on CT; when at [facility] the pt did not want bx or treatment.      PMHx: breast Ca w/mets, PUENTE, liver cirrhosis, HTN  Family / Caregiver Present: No  Referring Practitioner: Sukhi Campbell Puthoff, APRN - CNP  Diagnosis: encephalopathy, NSTEMI, hypothermia and rash on buttocks  Subjective  Subjective: Pt met b/s for OT eval/tx. Pt in recliner working with PT on arrival, agreeable to participate in therapy for overlap. Pt reports 10/10 pain in arms, back, chest, and legs.     Social/Functional History  Social/Functional History  Lives With: Alone(has 2 different HHA during the day x 6 hours total)  Type of Home: Apartment(1st floor)  Home Layout: One level  Home Access: Level entry  Bathroom Shower/Tub: (the pt does not use the tub or shower; gets a sponge bath in bed or in the LIFT chair)  Bathroom Toilet: Bedside commode  Home Equipment: 4 wheeled walker, Wheelchair-manual, Wheelchair-electric, Lift chair, Hospital bed, Alert Button, [address] Help From: Home health(has 2 different HHA during the day x 6-7 hours total, 7 days/week, 3 times a day for 2 1/2 hours each time; also has a HHN that comes 1x/week)  ADL Assistance: Needs assistance(HHA give the pt a sponge bath, assist w/getting dressed, assist with getting to the commode)  Homemaking Assistance: (HHA's do all; gets meals, groceries and manages medications; HHN 1x/week for vitals)  Homemaking Responsibilities: No  Ambulation Assistance: Needs assistance(non-ambulatory at baseline; asisst needed also for w/c mobility)  Transfer Assistance: Needs assistance(needs assist for lateral transfe to the commode that sits next to kimber LIFT chair; reports she mostly stays in her LIFT chair during the day and sleeps there also)  Active : No  Patient's  Info: medical transport and the pt is in her w/c  Additional Comments: denies recent falls; the pt is non-ambulatory at baseline and reports she has not been able to stand on her feet to do a pivot tansfer in 3-4 months       Objective   Vision: Impaired  Vision Exceptions: Wears glasses for reading  Hearing: Within functional limits      Orientation  Overall Orientation Status: term goals  Time Frame for Short term goals: Prior to d/c:  Short term goal 1: Pt will complete grooming with set-up/SBA. Short term goal 2: Pt will complete UB bathing/dressing with max A. Short term goal 3: Pt will tolerate 10-15 minutes of B UE therex to improve strength/ROM for ADLs. Short term goal 4: Pt will complete bed mobility with max A x1-2 in prep for ADL task. Short term goal 5: Pt will tolerate sitting EOB >3 minutes with mod A x1-2. Long term goals  Time Frame for Long term goals : STG=LTG  Patient Goals   Patient goals : to return home.        Therapy Time   Individual Concurrent Group Co-treatment   Time In 1405         Time Out 1430         Minutes 25         Timed Code Treatment Minutes: [id number], OTR/L 4940

## 2020-09-11 NOTE — PROGRESS NOTES
Clinical Pharmacy Note  Heparin Dosing       Lab Results   Component Value Date    APTT 94.1 09/11/2020     Lab Results   Component Value Date    HGB 11.1 09/11/2020    HCT 32.7 09/11/2020    PLT see below 09/11/2020    INR 1.19 06/10/2010       Current Infusion Rate: 4.1 mL/hr    Plan:  Hold infusion for 1 hour  Decrease rate to: 2 mL/hr  Next aPTT: 09/11/20 2100    Pharmacy will continue to monitor and adjust based on aPTT results.   Marcos Anand [facility]

## 2020-09-11 NOTE — CARE COORDINATION
[facility] Wound Ostomy Continence Nurse  Consult Note       NAME:  Williams Eric  MEDICAL RECORD NUMBER:  [id number]  AGE: [de-identified] y.o. GENDER: female  : 1940  TODAY'S DATE:  2020    Subjective   Reason for WOCN Evaluation and Assessment: advanced ulcers to sacral and coccyx area   Pt has MASD mixed with fungal rash. Williams Eric is a [de-identified] y.o. female referred by:   [] Physician  [x] Nursing  [] Other:     Wound Identification:  Wound Type: MASD with fungal rash  Contributing Factors: incontinence of urine    Wound History: noted on admit  Current Wound Care Treatment:  Zinc barrier    Patient Goal of Care:  [x] Wound Healing  [] Odor Control  [] Palliative Care  [] Pain Control   [] Other:         PAST MEDICAL HISTORY        Diagnosis Date    Cancer [facility])     Aid states \"all over her body\"       PAST SURGICAL HISTORY    No past surgical history on file. FAMILY HISTORY    No family history on file. SOCIAL HISTORY    Social History     Tobacco Use    Smoking status: Never Smoker    Smokeless tobacco: Never Used   Substance Use Topics    Alcohol use: Never     Frequency: Never    Drug use: Never       ALLERGIES    Allergies   Allergen Reactions    Ace Inhibitors     Amlodipine     Diclofenac        MEDICATIONS    No current facility-administered medications on file prior to encounter. Current Outpatient Medications on File Prior to Encounter   Medication Sig Dispense Refill    vitamin B-12 (CYANOCOBALAMIN) 500 MCG tablet Take 500 mcg by mouth daily      atorvastatin (LIPITOR) 10 MG tablet Take 10 mg by mouth nightly      folic acid (FOLVITE) 1 MG tablet Take 1 mg by mouth daily      hydrocodone-acetaminophen (NORCO) 5-325 MG per tablet Take 1 tablet by mouth every 6 hours as needed.  POTASSIUM CHLORIDE PO Take 40 mEq by mouth daily       gabapentin (NEURONTIN) 300 MG capsule Take 300 mg by mouth 2 times daily.        metoprolol (LOPRESSOR) 50 MG tablet Take 50 mg by mouth 2 times daily.  losartan (COZAAR) 25 MG tablet Take 25 mg by mouth daily.  zolpidem (AMBIEN) 10 MG tablet Take 10 mg by mouth nightly as needed.  famotidine (PEPCID) 20 MG tablet Take 20 mg by mouth daily       furosemide (LASIX) 40 MG tablet Take 40 mg by mouth daily       therapeutic multivitamin-minerals (THERAGRAN-M) tablet Take 1 tablet by mouth daily.  Polyethyl Glycol-Propyl Glycol (SYSTANE) 0.4-0.3 % SOLN Apply 2 drops to eye as needed          Objective    /71   Pulse 83   Temp 97.5 °F (36.4 °C) (Oral)   Resp 19   Ht 5' 3\" (1.6 m)   Wt 206 lb 2.1 oz (93.5 kg)   SpO2 95%   BMI 36.51 kg/m²     LABS:  WBC:    Lab Results   Component Value Date    WBC 4.5 09/11/2020     H/H:    Lab Results   Component Value Date    HGB 11.1 09/11/2020    HCT 32.7 09/11/2020     PTT:    Lab Results   Component Value Date    APTT >248.0 09/11/2020   [APTT}  PT/INR:    Lab Results   Component Value Date    PROTIME 12.6 06/10/2010    INR 1.19 06/10/2010     HgBA1c:  No results found for: LABA1C    Assessment   Geo Risk Score: Geo Scale Score: 12    Patient Active Problem List   Diagnosis Code    NSTEMI (non-ST elevated myocardial infarction) ([facility] I21.4    [facility] C50.919    Encephalopathy G93.40    PUENTE (nonalcoholic steatohepatitis) K75.81    Essential hypertension I10    Hypothermia T68. XXXA    Skin breakdown L90.9       Measurements:  Wound 09/10/20 Chest Left;Upper (Active)   Wound Skin Tear 09/11/20 1000   Dressing Status New drainage 09/11/20 1000   Dressing Changed Changed/New 09/11/20 1000   Dressing/Treatment Foam;Silver dressing 09/11/20 1000   Wound Cleansed Other (Comment) 09/11/20 1000   Dressing Change Due 09/13/20 09/11/20 1000   Wound Length (cm) 0.3 cm 09/11/20 1000   Wound Width (cm) 0.3 cm 09/11/20 1000   Wound Depth (cm) 0.1 cm 09/11/20 1000   Wound Surface Area (cm^2) 0.09 cm^2 09/11/20 1000   Wound Volume (cm^3) 0.01 cm^3 09/11/20 1000 Wound Assessment Bleeding 09/11/20 1000   Drainage Amount Large 09/11/20 1000   Drainage Description Sanguinous 09/11/20 1000   Odor None 09/11/20 1000   Margins Defined edges 09/11/20 1000   Enedelia-wound Assessment Dry 09/11/20 1000   Cozad%Wound Bed 100 09/11/20 1000   Number of days: 0          Pt seen. Has fungal rash to abd fold, groin, under breasts and sacral area. 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 Left chest skin tear bleeding. aquacel ag and foam border applied. Response to treatment:  Well tolerated by patient.        Plan   Plan of Care: Wound 09/10/20 Chest Left;Upper-Dressing/Treatment: Foam, Silver dressing(aquacel ag and foam border)    Specialty Bed Required : Yes   [x] Low Air Loss   [] Pressure Redistribution  [] Fluid Immersion  [] Bariatric  [] Total Pressure Relief  [] Other:     Current Diet: DIET CLEAR LIQUID;  Dietician consult:  No    Discharge Plan:  Placement for patient upon discharge: home with support    Patient appropriate for Outpatient [address]: No    Referrals:  []   [] [facility]  [] Supplies  [] Other    Patient/Caregiver Teaching:  Level of patient/caregiver understanding able to:   [] Indicates understanding       [x] Needs reinforcement  [] Unsuccessful      [] Verbal Understanding  [] Demonstrated understanding       [] No evidence of learning  [] Refused teaching         [] N/A       Electronically signed by Dorna Curling on 9/11/2020 at 10:21 AM

## 2020-09-11 NOTE — CARE COORDINATION
Met with alert and oriented patient for goals of care. Patient adamant on returning home with continued home health care with hospice for assistance to avoid returning to hospital.  Declines ECF consideration despite expressed concerns on returning home alone. HOC follow up in AM.        Zabrina Smith RN, [address], [address] [place] [address]   O: [phone]  C: [phone]  F: [phone]   Main & Referrals: [phone]   [email or web address]

## 2020-09-11 NOTE — PROGRESS NOTES
Clinical Pharmacy Note  Heparin Dosing       Lab Results   Component Value Date    APTT >248.0 09/11/2020     Lab Results   Component Value Date    HGB 11.1 09/11/2020    HCT 32.7 09/11/2020    PLT see below 09/10/2020    INR 1.19 06/10/2010       Current Infusion Rate: 10 mL/hr    Plan:  Hold heparin for 1 hour  Rate: decrease to 4.1 mL/hr  Next aPTT: 1300 9/11/20    Pharmacy will continue to monitor and adjust based on aPTT results.   Bucky JacksonD

## 2020-09-11 NOTE — CONSULTS
Take 1 tablet by mouth daily.  Polyethyl Glycol-Propyl Glycol (SYSTANE) 0.4-0.3 % SOLN Apply 2 drops to eye as needed          Objective         /70   Pulse 60   Temp 97.5 °F (36.4 °C) (Oral)   Resp 18   Ht 5' 3\" (1.6 m)   Wt 206 lb 2.1 oz (93.5 kg)   SpO2 98%   BMI 36.51 kg/m²     Code Status: DNR-CC    Advanced Directives: not completed     Assessment        Management and Education    Persons available for education:      [x] Self     [] Caregiver       [] Spouse       [] Other Family Member   []  Other    Spiritual History:  notified: Yes,     Does the patient have a Primary Care Physician? Yes    Level of patient/caregiver understanding able to:        [x] Verbalize Understanding   [] Demonstrate Understanding       [] Teach Back       [] Needs Reinforcement     []  Other:      Teaching Time:  1hours  0 minutes     Plan        Social Service Consult Made:  Yes  Assistance filling out Living Will/HPOA:  No      Discharge Plan:  Education/support to family  Providing support for coping/adaptation/distress of family  Clarification of medical condition to patient and family  [place] [facility]  Palliative care orders introduced  Provided information about hospice    Discharge Environment:  [x] Hospice Consult Agency: List provided chose [facility]   [x] Inpatient Hospice vs   [x] Home with Hospice Care     Discussion: Patient up in chair, alert and oriented to person, place, time and situation. She lives at home alone has care givers to assist her from [employer] on aging. She does not have HPOA/living will does not want to complete. Her plan is to return home with her [employer] on aging care givers. We have discussed code status she agrees she wants to die peacefully no heroics, no chest compressions/shocking or ventilator.  We have discussed going home with hospice and care givers she thinks that is a good idea, she does not want to return to hospital and understands she

## 2020-09-11 NOTE — PROGRESS NOTES
4 Eyes Skin Assessment     The patient is being assess for  Admission    I agree that 2 RN's have performed a thorough Head to Toe Skin Assessment on the patient. ALL assessment sites listed below have been assessed. Areas assessed by both nurses:  [x]   Head, Face, and Ears   [x]   Shoulders, Back, and Chest  [x]   Arms, Elbows, and Hands   [x]   Coccyx, Sacrum, and IschIum  [x]   Legs, Feet, and Heels        Does the Patient have Skin Breakdown?   Yes [facility] WOUND CARE was Initiated documentation include the Enedelia-wound, Wound Assessment, Measurements, Dressing Treatment, Drainage, and Color\",         Geo Prevention initiated:  Yes   Wound Care Orders initiated:  Yes      [id number] nurse consulted for Pressure Injury (Stage 3,4, Unstageable, DTI, NWPT, and Complex wounds), New and Established Ostomies:  Yes      Nurse 1 eSignature: Electronically signed by Vahe Santacruz RN on 9/11/20 at 4:18 AM EDT     **SHARE this note so that the co-signing nurse is able to place an eSignature**    Nurse 2 eSignature: Electronically signed by Jaylene Farooq RN on 9/11/20 at 4:20 AM EDT

## 2020-09-11 NOTE — PROGRESS NOTES
Physical Therapy    Facility/Department: [address] MED SURG  Initial Assessment    NAME: Irina Client  : 1940  MRN: [id number]    Date of Service: 2020    Discharge Recommendations:  Continue to assess pending progress   PT Equipment Recommendations  Other: will monitor    Assessment   Assessment: Per H&P on 9- of Rina Christine, APRN - CNP: The pt is an [de-identified] yo female who was brought to the ED per her HHA due to increased weakness. The pt was at [address] [place] [address] last month with altered MS. The pt being treated for encephalopathy, NSTEMI, hypothermia and rash on buttocks. The pt also known to have metastatic breast Ca with with R axillary mass seen on CT; when at [facility] the pt did not want bx or treatment. The pt reports she lives alone but has 2 HHA that come 3x/day, 7 days/week that assist her with all self-care and do all homemaking chores. The pt reports she has been non-ambulatory for a while and unable to do a pivot transfer in 3-4 months. PMHx: breast Ca w/mets, PUENTE, liver cirrhosis, HTN      Today, the pt presents in 10/10 pain in various parts of her body, she needed encouragement to get out of bed; she was dependent for rolling for a maxi-LIFT pad placement and dependent for transfer to the chair. Discussed d/c plans with the pt and she still reports she wants to go home. The pt does not appear to be functioning at a safe level for her to return home to her current situation; she may benefit from con't skilled PT if she is willing to participate. Will con't to follow this pt on a trial basis; if she is unable to participate or improve then will d/c from PT services.   Specific instructions for Next Treatment: cotx  Prognosis: Guarded  Decision Making: Medium Complexity  History: see above  Exam: see above  Clinical Presentation: evolving  PT Education: PT Role;General Safety  Barriers to Learning: pain  REQUIRES PT FOLLOW UP: Yes  Activity Tolerance  Activity Tolerance: Patient limited total)  Type of Home: Apartment(1st floor)  Home Layout: One level  Home Access: Level entry  Bathroom Shower/Tub: (the pt does not use the tub or shower; gets a sponge bath in bed or in the LIFT chair)  Bathroom Toilet: Bedside commode  Home Equipment: 4 wheeled walker, Wheelchair-manual, Wheelchair-electric, Lift chair, Hospital bed, Alert Button, [address] Help From: Home health(has 2 different HHA during the day x 6-7 hours total, 7 days/week, 3 times a day for 2 1/2 hours each time; also has a HHN that comes 1x/week)  ADL Assistance: Needs assistance(HHA give the pt a sponge bath, assist w/getting dressed, assist with getting to the commode)  Homemaking Assistance: (HHA's do all; gets meals, groceries and manages medications; HHN 1x/week for vitals)  Homemaking Responsibilities: No  Ambulation Assistance: Needs assistance(non-ambulatory at baseline; asisst needed also for w/c mobility)  Transfer Assistance: Needs assistance(needs assist for lateral transfe to the commode that sits next to kimber LIFT chair; reports she mostly stays in her LIFT chair during the day and sleeps there also)  Active : No  Patient's  Info: medical transport and the pt is in her w/c  Additional Comments: denies recent falls; the pt is non-ambulatory at baseline and reports she has not been able to stand on her feet to do a pivot tansfer in 3-4 months    Objective  PROM RLE (degrees)  RLE PROM: WFL  PROM LLE (degrees)  LLE General PROM: ankle DF  to neutral; hip and knee severely limited by pain (the pt reports she needs a THR and a TKR)  Strength RLE  Comment: severely limited in functional strength thru-out; cannot move against gravity and cannot assist to reposition heself in the bed  Strength LLE  Comment: severely limited in functional strength thru-out; cannot move against gravity and cannot assist to reposition heself in the bed     Sensation  Overall Sensation Status: WFL  Bed mobility  Rolling to Left:

## 2020-09-12 LAB
BASOPHILS ABSOLUTE: 0 K/UL (ref 0–0.2)
BASOPHILS RELATIVE PERCENT: 0.3 %
EOSINOPHILS ABSOLUTE: 0.1 K/UL (ref 0–0.6)
EOSINOPHILS RELATIVE PERCENT: 1.2 %
HCT VFR BLD CALC: 34.1 % (ref 36–48)
HEMOGLOBIN: 11.4 G/DL (ref 12–16)
LV EF: 58 %
LVEF MODALITY: NORMAL
LYMPHOCYTES ABSOLUTE: 1.2 K/UL (ref 1–5.1)
LYMPHOCYTES RELATIVE PERCENT: 16.5 %
MCH RBC QN AUTO: 35 PG (ref 26–34)
MCHC RBC AUTO-ENTMCNC: 33.4 G/DL (ref 31–36)
MCV RBC AUTO: 104.8 FL (ref 80–100)
MONOCYTES ABSOLUTE: 0.7 K/UL (ref 0–1.3)
MONOCYTES RELATIVE PERCENT: 9.8 %
NEUTROPHILS ABSOLUTE: 5.2 K/UL (ref 1.7–7.7)
NEUTROPHILS RELATIVE PERCENT: 72.2 %
PDW BLD-RTO: 17 % (ref 12.4–15.4)
PLATELET # BLD: 87 K/UL (ref 135–450)
PLATELET SLIDE REVIEW: ABNORMAL
PMV BLD AUTO: 9.2 FL (ref 5–10.5)
RBC # BLD: 3.25 M/UL (ref 4–5.2)
WBC # BLD: 7.2 K/UL (ref 4–11)

## 2020-09-12 PROCEDURE — 93306 TTE W/DOPPLER COMPLETE: CPT

## 2020-09-12 PROCEDURE — 6370000000 HC RX 637 (ALT 250 FOR IP): Performed by: INTERNAL MEDICINE

## 2020-09-12 PROCEDURE — 94760 N-INVAS EAR/PLS OXIMETRY 1: CPT

## 2020-09-12 PROCEDURE — 6360000002 HC RX W HCPCS: Performed by: NURSE PRACTITIONER

## 2020-09-12 PROCEDURE — 85025 COMPLETE CBC W/AUTO DIFF WBC: CPT

## 2020-09-12 PROCEDURE — 2700000000 HC OXYGEN THERAPY PER DAY

## 2020-09-12 PROCEDURE — 1200000000 HC SEMI PRIVATE

## 2020-09-12 PROCEDURE — 6370000000 HC RX 637 (ALT 250 FOR IP): Performed by: NURSE PRACTITIONER

## 2020-09-12 PROCEDURE — 2580000003 HC RX 258: Performed by: NURSE PRACTITIONER

## 2020-09-12 PROCEDURE — 36415 COLL VENOUS BLD VENIPUNCTURE: CPT

## 2020-09-12 RX ADMIN — FUROSEMIDE 40 MG: 40 TABLET ORAL at 09:47

## 2020-09-12 RX ADMIN — ACETAMINOPHEN 650 MG: 325 TABLET ORAL at 04:07

## 2020-09-12 RX ADMIN — TRAMADOL HYDROCHLORIDE 50 MG: 50 TABLET, FILM COATED ORAL at 09:47

## 2020-09-12 RX ADMIN — MICONAZOLE NITRATE: 20 POWDER TOPICAL at 21:50

## 2020-09-12 RX ADMIN — TRAMADOL HYDROCHLORIDE 50 MG: 50 TABLET, FILM COATED ORAL at 16:25

## 2020-09-12 RX ADMIN — Medication 10 ML: at 09:49

## 2020-09-12 RX ADMIN — FOLIC ACID 1 MG: 1 TABLET ORAL at 09:47

## 2020-09-12 RX ADMIN — ONDANSETRON 4 MG: 2 INJECTION INTRAMUSCULAR; INTRAVENOUS at 17:21

## 2020-09-12 RX ADMIN — MULTIPLE VITAMINS W/ MINERALS TAB 1 TABLET: TAB at 09:47

## 2020-09-12 RX ADMIN — CYANOCOBALAMIN TAB 500 MCG 500 MCG: 500 TAB at 09:47

## 2020-09-12 RX ADMIN — LOSARTAN POTASSIUM 25 MG: 25 TABLET, FILM COATED ORAL at 09:47

## 2020-09-12 RX ADMIN — Medication 10 ML: at 21:50

## 2020-09-12 RX ADMIN — ACETAMINOPHEN 650 MG: 325 TABLET ORAL at 12:01

## 2020-09-12 RX ADMIN — MICONAZOLE NITRATE: 20 POWDER TOPICAL at 09:48

## 2020-09-12 RX ADMIN — ASPIRIN 81 MG: 81 TABLET, CHEWABLE ORAL at 09:47

## 2020-09-12 ASSESSMENT — PAIN SCALES - GENERAL
PAINLEVEL_OUTOF10: 0
PAINLEVEL_OUTOF10: 4
PAINLEVEL_OUTOF10: 10
PAINLEVEL_OUTOF10: 0
PAINLEVEL_OUTOF10: 3
PAINLEVEL_OUTOF10: 10
PAINLEVEL_OUTOF10: 4

## 2020-09-12 ASSESSMENT — PAIN DESCRIPTION - PAIN TYPE: TYPE: CHRONIC PAIN

## 2020-09-12 ASSESSMENT — PAIN DESCRIPTION - PROGRESSION: CLINICAL_PROGRESSION: GRADUALLY WORSENING

## 2020-09-12 ASSESSMENT — PAIN SCALES - WONG BAKER
WONGBAKER_NUMERICALRESPONSE: 0
WONGBAKER_NUMERICALRESPONSE: 2
WONGBAKER_NUMERICALRESPONSE: 0

## 2020-09-12 ASSESSMENT — PAIN DESCRIPTION - LOCATION: LOCATION: GENERALIZED

## 2020-09-12 ASSESSMENT — PAIN DESCRIPTION - FREQUENCY: FREQUENCY: INTERMITTENT

## 2020-09-12 ASSESSMENT — PAIN - FUNCTIONAL ASSESSMENT: PAIN_FUNCTIONAL_ASSESSMENT: PREVENTS OR INTERFERES SOME ACTIVE ACTIVITIES AND ADLS

## 2020-09-12 ASSESSMENT — PAIN DESCRIPTION - ONSET: ONSET: GRADUAL

## 2020-09-12 ASSESSMENT — PAIN DESCRIPTION - DESCRIPTORS: DESCRIPTORS: ACHING;DISCOMFORT

## 2020-09-12 NOTE — CARE COORDINATION
Received call from [place] [id number] who states she will have [facility]  meet with family re Respite stay. Many family members are here today visiting pt. Will await [facility] decision.   Electronically signed by DAVID Lewis on 9/12/2020 at 2:38 PM

## 2020-09-12 NOTE — PROGRESS NOTES
New telephone ordered from [facility]. New phone placed in room. RN called family for pt. Pt in contact with children (Son and Daughter). They were updated on pt care and tx plan per request from pt. Pt's children will be in the morning to talk too the MD and see what the plan is for pt. Updated the family that pt was seen by hospice and she was admant on going home with care.  RN notified them they are allowed to come in visiting hours and see their mom, between 9am and 7pm. Agreeable to come and see pt in the AM.

## 2020-09-12 NOTE — CONSULTS
[address] [facility]                                  CONSULTATION    PATIENT NAME: Joon Erazo                    :        1940  MED REC NO:   [id number]                          ROOM:       2141  ACCOUNT NO:   [de-identified]                           ADMIT DATE: 09/10/2020  PROVIDER:     Bibi Remy MD    CARDIOLOGY CONSULTATION    CONSULT DATE:  2020    HISTORY OF PRESENT ILLNESS:  This is an 66-year-old female who has an  advanced breast CA with mets who had presented due to worsening of her  mental status. She has a home health care provided by two different  aides. She was found to be weaker than before and she was brought to  the hospital.  During the hospitalization, she underwent a troponin  evaluation which was abnormal leading to this cardiac consultation. The  patient denies having any chest tightness, she supposedly had some  slurred speech though. She is not a great historian so detailed history  is very difficult. REVIEW OF SYSTEMS:  As mentioned, she is very limited. She was at [facility] from  to _____ with some altered  mental status and she was treated with antibiotics and she was also  found to have a large right axillary mass with neoplastic disease and  multiple pulmonary nodules. She also had a urinary tract infection at  that time. PAST MEDICAL HISTORY:  1. As mentioned, breast CA. 2.  No known history of diabetes or hypertension. 3.  History of altered mental status. PAST SURGICAL HISTORY:  Currently not available. SOCIAL HISTORY:  No known history of smoking or alcohol abuse. FAMILY HISTORY:  Negative for any early, premature atherosclerosis. MEDICINES AND ALLERGIES:  Have been reviewed.     PHYSICAL EXAMINATION:  VITAL SIGNS:  Her pulse is 53 and regular, blood pressure 92/60,  respirations are 18, oxygen saturation 98%.  CONSTITUTIONAL:  She is drowsy but easily arousable, alert and oriented  to time and place. HEENT EXAMINATION:  Her neck is supple. No JVD. No thyromegaly. No  lymphadenopathy appreciated. CARDIAC EXAMINATION:  Reveals regular rate and rhythm. I am unable to  appreciate any gallop, murmur or rub. LUNGS:  Reveal bibasilar crackles. ABDOMEN:  Slightly distended. Bowel sounds are present. No  organomegaly. EXTREMITIES:  Show changes of chronic venous insufficiency with 2 to 3+  bilateral lower extremity edema. NEUROLOGICAL STATUS:  No focal deficit but adequate examination is  difficult since the patient is extremely weak. SKIN:  Shows no rashes. LABORATORY DATA:  Sodium is 137, potassium 3.9, chloride 101, bicarb 29,  BUN is 14, creatinine less than 0.5. Her troponin level was 0.23 and is  down to 0.21. Albumin is 1.9. Her SGOT is 132. TSH is 2.31. White  count 4.9, hemoglobin 11.1, hematocrit is 32.9. MCV is 104. 6. EKG shows sinus rhythm, poor R-wave progression. Chest x-ray shows  right perihilar and left basilar opacification with cardiomegaly. IMPRESSION:  1. This is an 71-year-old female who has presented with altered mental  status. She has a troponin elevation. Etiology of this remains  unclear. She has no chest pain. Certainly, underlying sudden ischemic  heart disease with non-ST elevation MI is a distinct possibility but due  to her age and her advanced breast CA, no interventional treatment or  workup will be needed. 2.  Anemia. 3.  Severe protein-calorie malnutrition with albumin of 1.9. RECOMMENDATIONS:  1.  I will discontinue heparin and serial troponins. 2.  We will maintain the patient on baby aspirin and beta-blocker  therapy. 3.  We will sign off on the patient as no further cardiac treatment will  be required at this time. Hospice care appears appropriate.     I appreciate the opportunity to participate in the care of this pleasant  female.         Ila Kelly MD    D: 09/11/2020 16:24:12       T: 09/11/2020 18:43:00     [email or web address]  Job#: [id number]     Doc#: [id number]    CC:

## 2020-09-13 VITALS
OXYGEN SATURATION: 97 % | WEIGHT: 206.13 LBS | DIASTOLIC BLOOD PRESSURE: 59 MMHG | RESPIRATION RATE: 17 BRPM | BODY MASS INDEX: 36.52 KG/M2 | SYSTOLIC BLOOD PRESSURE: 103 MMHG | TEMPERATURE: 94.5 F | HEIGHT: 63 IN | HEART RATE: 49 BPM

## 2020-09-13 PROCEDURE — 6370000000 HC RX 637 (ALT 250 FOR IP): Performed by: INTERNAL MEDICINE

## 2020-09-13 PROCEDURE — 2580000003 HC RX 258: Performed by: NURSE PRACTITIONER

## 2020-09-13 PROCEDURE — 94760 N-INVAS EAR/PLS OXIMETRY 1: CPT

## 2020-09-13 PROCEDURE — 6370000000 HC RX 637 (ALT 250 FOR IP): Performed by: NURSE PRACTITIONER

## 2020-09-13 PROCEDURE — 2700000000 HC OXYGEN THERAPY PER DAY

## 2020-09-13 RX ORDER — OXYCODONE HYDROCHLORIDE 5 MG/1
5 TABLET ORAL EVERY 6 HOURS PRN
Status: DISCONTINUED | OUTPATIENT
Start: 2020-09-13 | End: 2020-09-13 | Stop reason: HOSPADM

## 2020-09-13 RX ADMIN — Medication 10 ML: at 07:49

## 2020-09-13 RX ADMIN — CYANOCOBALAMIN TAB 500 MCG 500 MCG: 500 TAB at 08:01

## 2020-09-13 RX ADMIN — FUROSEMIDE 40 MG: 40 TABLET ORAL at 08:00

## 2020-09-13 RX ADMIN — LOSARTAN POTASSIUM 25 MG: 25 TABLET, FILM COATED ORAL at 08:00

## 2020-09-13 RX ADMIN — ASPIRIN 81 MG: 81 TABLET, CHEWABLE ORAL at 08:00

## 2020-09-13 RX ADMIN — FOLIC ACID 1 MG: 1 TABLET ORAL at 08:00

## 2020-09-13 RX ADMIN — TRAMADOL HYDROCHLORIDE 50 MG: 50 TABLET, FILM COATED ORAL at 06:47

## 2020-09-13 RX ADMIN — MICONAZOLE NITRATE: 20 POWDER TOPICAL at 08:04

## 2020-09-13 RX ADMIN — OXYCODONE HYDROCHLORIDE 5 MG: 5 TABLET ORAL at 11:15

## 2020-09-13 RX ADMIN — MULTIPLE VITAMINS W/ MINERALS TAB 1 TABLET: TAB at 08:01

## 2020-09-13 RX ADMIN — ACETAMINOPHEN 650 MG: 325 TABLET ORAL at 08:00

## 2020-09-13 RX ADMIN — METOPROLOL TARTRATE 25 MG: 25 TABLET, FILM COATED ORAL at 08:00

## 2020-09-13 ASSESSMENT — PAIN DESCRIPTION - ONSET
ONSET: GRADUAL
ONSET: GRADUAL

## 2020-09-13 ASSESSMENT — PAIN DESCRIPTION - LOCATION
LOCATION: HEAD
LOCATION: NECK

## 2020-09-13 ASSESSMENT — PAIN SCALES - GENERAL
PAINLEVEL_OUTOF10: 0
PAINLEVEL_OUTOF10: 10
PAINLEVEL_OUTOF10: 0
PAINLEVEL_OUTOF10: 3
PAINLEVEL_OUTOF10: 7

## 2020-09-13 ASSESSMENT — PAIN DESCRIPTION - FREQUENCY
FREQUENCY: INTERMITTENT
FREQUENCY: INTERMITTENT

## 2020-09-13 ASSESSMENT — PAIN DESCRIPTION - PAIN TYPE
TYPE: CHRONIC PAIN
TYPE: ACUTE PAIN

## 2020-09-13 ASSESSMENT — PAIN SCALES - WONG BAKER
WONGBAKER_NUMERICALRESPONSE: 0

## 2020-09-13 ASSESSMENT — PAIN DESCRIPTION - PROGRESSION
CLINICAL_PROGRESSION: GRADUALLY IMPROVING
CLINICAL_PROGRESSION: GRADUALLY WORSENING

## 2020-09-13 ASSESSMENT — PAIN DESCRIPTION - DESCRIPTORS
DESCRIPTORS: DISCOMFORT
DESCRIPTORS: HEADACHE

## 2020-09-13 ASSESSMENT — PAIN DESCRIPTION - ORIENTATION: ORIENTATION: MID

## 2020-09-13 ASSESSMENT — PAIN - FUNCTIONAL ASSESSMENT
PAIN_FUNCTIONAL_ASSESSMENT: ACTIVITIES ARE NOT PREVENTED
PAIN_FUNCTIONAL_ASSESSMENT: PREVENTS OR INTERFERES SOME ACTIVE ACTIVITIES AND ADLS

## 2020-09-13 NOTE — DISCHARGE INSTR - COC
Continuity of Care Form    Patient Name: Amelie Stovall   :  1940  MRN:  [id number]    Admit date:  9/10/2020  Discharge date:  ***    Code Status Order: [facility]   Advance Directives:   [address] Directive Type of Healthcare Directive Copy in [address] Agent's Name Healthcare Agent's Phone Number    20 1051  Yes, patient has an advance directive for healthcare treatment -- --  Healthcare power of   Aleena Gomez alt: Neftali Erwin and Melani 61 [phone] Osgood [phone]    20 0506  No, patient does not have an advance directive for healthcare treatment -- --  --  --  --          Admitting Physician:  Ruiz Hooper DO  PCP: Pricila Crowder    Discharging Nurse: [facility] Unit/Room#: [id number]-[id number]/[id number]  Discharging Unit Phone Number: ***    Emergency Contact:   Extended Emergency Contact Information  Primary Emergency Contact: Bakari Peguero  Address: [address] [facility], [address] [facility] Phone: [phone]  Work Phone: [phone]  Relation: Child  Secondary Emergency Contact: Ahsan Peguero  Address: [facility] [address]           [address] Phone: [phone]  Relation: Other    Past Surgical History:  No past surgical history on file. Immunization History: There is no immunization history on file for this patient. Active Problems:  Patient Active Problem List   Diagnosis Code    NSTEMI (non-ST elevated myocardial infarction) ([facility] [address] I21.4    Metastatic breast cancer ([facility] [address] C50.919    Encephalopathy G93.40    PUENTE (nonalcoholic steatohepatitis) K75.81    Essential hypertension I10    Hypothermia T68. [place] Skin breakdown L90.9       Isolation/Infection:   Isolation          No Isolation        Unreconciled External Infections     Infection Onset Last Indicated Last Received Source    No mapped external infections found    . Unmapped Infections (1)      Carbapenem-resistant Enterobacteriaceae 08/27/20 08/27/20 09/10/20             Patient Infection Status     None to display          Nurse Assessment:  Last Vital Signs: BP (!) 103/59   Pulse (!) 49   Temp 94.5 °F (34.7 °C) (Axillary)   Resp 17   Ht 5' 3\" (1.6 m)   Wt 206 lb 2.1 oz (93.5 kg)   SpO2 97%   BMI 36.51 kg/m²     Last documented pain score (0-10 scale): Pain Level: 0  Last Weight:   Wt Readings from Last 1 Encounters:   20 206 lb 2.1 oz (93.5 kg)     Mental Status:  {IP PT MENTAL STATUS:}    IV Access:  { ALFONSO IV ACCESS:259635831}    Nursing Mobility/ADLs:  Walking   {P DME AUCH:357984883}  Transfer  {P DME EUJV:622223984}  Bathing  {P DME SGCR:384378366}  Dressing  {P DME GAP}  Toileting  {P DME KPRI:148858707}  Feeding  {[facility] DME DGGF:496675107}  Med Admin  {[facility] DME NGMR:757326965}  Med Delivery   { ALFONSO MED Delivery:946580116}    Wound Care Documentation and Therapy:  Wound 09/10/20 Sacrum (Active)   Dressing Changed Other (Comment) 20   Dressing/Treatment Moisture barrier 20   Wound Assessment Pink;Red 20   Drainage Amount None 2046   Odor None 20   Number of days: 2       Wound 09/10/20 Chest Left;Upper (Active)   Wound Skin Tear 20   Dressing Status Clean;Dry; Intact 2046   Dressing Changed Other (Comment) 20   Dressing/Treatment Foam 20   Wound Cleansed Other (Comment) 20 1000   Dressing Change Due 20 1000   Wound Length (cm) 0.3 cm 20 1000   Wound Width (cm) 0.3 cm 20 1000   Wound Depth (cm) 0.1 cm 20 1000   Wound Surface Area (cm^2) 0.09 cm^2 20 1000   Wound Volume (cm^3) 0.01 cm^3 20 1000   Wound Assessment Bleeding 20 1000   Drainage Amount Large 20 1000   Drainage Description Sanguinous 20 1000   Odor None 20 1000   Margins Defined edges 20 1000   Enedelia-wound Assessment Dry 20 1000   [place]%Wound Bed 100 20 1000   Number of days: 2        Elimination:  Continence:   · Bowel: {YES / MO:06399}  · Bladder: {YES / EP:93402}  Urinary Catheter: {Urinary Catheter:431992895}   Colostomy/Ileostomy/Ileal Conduit: {YES / JV:20494}       Date of Last BM: ***    Intake/Output Summary (Last 24 hours) at 2020 1441  Last data filed at 2020 0659  Gross per 24 hour   Intake --   Output 1100 ml   Net -1100 ml     I/O last 3 completed shifts:  In: -   Out: 1100 [Urine:1100]    Safety Concerns:     [employer] Safety Concerns:102387743}    Impairments/Disabilities:      [employer] Impairments/Disabilities:590452632}    Nutrition Therapy:  Current Nutrition Therapy:   [employer] Diet List:488585630}    Routes of Feeding: {[facility] DME Other Feedings:546088155}  Liquids: {Slp liquid thickness:77103}  Daily Fluid Restriction: {CHP DME Yes amt example:671883056}  Last Modified Barium Swallow with Video (Video Swallowing Test): {Done Not Done CTWQ:914167711}    Treatments at the Time of Hospital Discharge:   Respiratory Treatments: ***  Oxygen Therapy:  {Therapy; copd oxygen:50456}  Ventilator:    { CC Vent BOIU:689840490}    Rehab Therapies: {THERAPEUTIC INTERVENTION:2141052963}  Weight Bearing Status/Restrictions: [address] [employer]  Weight Bearin}  Other Medical Equipment (for information only, NOT a DME order):  {EQUIPMENT:351965419}  Other Treatments: ***    Patient's personal belongings (please select all that are sent with patient):  {[facility] DME Belongings:150361861}    RN SIGNATURE:  {Esignature:584298382}    CASE MANAGEMENT/SOCIAL WORK SECTION    Inpatient Status Date: ***    Readmission Risk Assessment Score:  Readmission Risk              Risk of Unplanned Readmission:        14           Discharging to Facility/ Agency   · Name:   · Address:  · Phone:  · Fax:    Dialysis Facility (if applicable)   · Name:  · Address:  · Dialysis Schedule:  · Phone:  · Fax:    / signature: {Esignature:817199821}    PHYSICIAN SECTION    Prognosis: {Prognosis:4232223469}    Condition at Discharge: Kylah Parekh Patient Condition:620046962}    Rehab Potential (if transferring to Rehab): {Prognosis:2927586060}    Recommended Labs or Other Treatments After Discharge: ***    Physician Certification: I certify the above information and transfer of Sapna Nye  is necessary for the continuing treatment of the diagnosis listed and that she requires {Admit to Appropriate Level of Care:03189} for {GREATER/LESS:574025747} 30 days.      Update Admission H&P: {CHP DME Changes in DVNBS:278347288}    PHYSICIAN SIGNATURE:  {Esignature:603837728}

## 2020-09-13 NOTE — PLAN OF CARE
Problem: Falls - Risk of:  Goal: Will remain free from falls  Description: Will remain free from falls  9/13/2020 0753 by Samantha Becerra  Outcome: Ongoing  9/13/2020 0358 by Thang Ocampo RN  Outcome: Ongoing  Goal: Absence of physical injury  Description: Absence of physical injury  9/13/2020 0753 by Samantha Becerra  Outcome: Ongoing  9/13/2020 0358 by Thang Ocampo RN  Outcome: Ongoing     Problem: Pain:  Goal: Pain level will decrease  Description: Pain level will decrease  9/13/2020 0753 by Samantha Becerra  Outcome: Ongoing  9/13/2020 0358 by Thang Ocampo RN  Outcome: Ongoing  Goal: Control of acute pain  Description: Control of acute pain  9/13/2020 0753 by Samantha Becerra  Outcome: Ongoing  9/13/2020 0358 by Thang Ocampo RN  Outcome: Ongoing  Goal: Control of chronic pain  Description: Control of chronic pain  9/13/2020 0753 by Samantha Becerra  Outcome: Ongoing  9/13/2020 0358 by Thang Ocampo RN  Outcome: Ongoing     Problem: Skin Integrity:  Goal: Will show no infection signs and symptoms  Description: Will show no infection signs and symptoms  9/13/2020 0753 by Samantha Becerra  Outcome: Ongoing  9/13/2020 0358 by Thang Ocampo RN  Outcome: Ongoing  Goal: Absence of new skin breakdown  Description: Absence of new skin breakdown  9/13/2020 0753 by Samantha Becerra  Outcome: Ongoing  9/13/2020 0358 by Thang Ocampo RN  Outcome: Ongoing     Problem: Nutrition  Goal: Optimal nutrition therapy  9/13/2020 0753 by Samantha Becerra  Outcome: Ongoing  9/13/2020 0358 by Thang Ocampo RN  Outcome: Ongoing

## 2020-09-13 NOTE — PROGRESS NOTES
[facility]osp[facility] of [address] [place] [address] is to move pt to our [address] [facility] [address] today at 1400 per PTS. Please call with questions or concerns.     Thank you for this referral.  Dusty Nazario RN  [phone]

## 2020-09-13 NOTE — PROGRESS NOTES
Hospitalist Progress Note      PCP: Laurie Márquez    Date of Admission: 9/10/2020    Subjective: multiple family members in room concerned that pt was terminal    Medications:  Reviewed    Infusion Medications   Scheduled Medications    metoprolol tartrate  25 mg Oral BID    aspirin  81 mg Oral Daily    vitamin B-12  500 mcg Oral Daily    therapeutic multivitamin-minerals  1 tablet Oral Daily    folic acid  1 mg Oral Daily    furosemide  40 mg Oral Daily    losartan  25 mg Oral Daily    sodium chloride flush  10 mL Intravenous 2 times per day    miconazole   Topical BID     PRN Meds: traMADol, perflutren lipid microspheres, sodium chloride flush, acetaminophen **OR** acetaminophen, polyethylene glycol, promethazine **OR** ondansetron    No intake or output data in the 24 hours ending 09/13/20 0341    Physical Exam Performed:    /63   Pulse 63   Temp 94.6 °F (34.8 °C) (Axillary)   Resp 16   Ht 5' 3\" (1.6 m)   Wt 206 lb 2.1 oz (93.5 kg)   SpO2 99%   BMI 36.51 kg/m²     General appearance: awake  Lungs: Clear to auscultation, bilaterally without Rales/Wheezes/Rhonchi with good respiratory effort. Heart: Regular rate and rhythm with Normal S1/S2  Abdomen: Large with pannus soft, non-tender or non-distended without rigidity or guarding and positive bowel sounds \  There is evidence of an erythematous moist patchy blanchable skin excoriated rash under both breasts and around the lower abdominal pannus fold and within the groin bilaterally. Extremities: Right upper extremity edema extending down towards the forearm. Mild left upper extremity edema also noted. Bilateral lower extremity pedal edema and mild erythema of both ankles and lower legs. The discoloration of the lower legs is chronic per the patient's aide. She weakly moves the upper extremities. And she is able to perform bilateral plantar flexion dorsiflexion.   The aide states that the patient has not been able to raise her legs independently for as long as she is known her. No clubbing, cyanosis, or edema bilaterally. Full range of motion without deformity and normal gait intact. Skin: Skin color, texture, turgor normal.  Rash under both breasts, abdominal pannus and skin breakdown stage II with excoriation the buttocks coccyx area. Neurologic: Alert and oriented to self  No facial drooping., neurovascularly intact with sensory/motor intact upper extremities/lower extremities, bilaterally. Cranial nerves: II-XII intact, grossly non-focal.  Mental status: Alert  Capillary Refill: Acceptable  < 3 seconds  Peripheral Pulses: +3 Easily felt, not easily obliterated with    Labs:   Recent Labs     09/10/20  1614 09/11/20  0427 09/12/20  1417   WBC 5.6 4.5 7.2   HGB 11.7* 11.1* 11.4*   HCT 34.7* 32.7* 34.1*   PLT see below see below 87*     Recent Labs     09/10/20  1614 09/11/20  0427    137   K 4.1 3.9    101   CO2 31 29   BUN 15 14   CREATININE 0.5* <0.5*   CALCIUM 8.3 8.1*     Recent Labs     09/10/20  1614 09/11/20  0427   AST 45* 44*   ALT 18 17   BILIDIR  --  <0.2   BILITOT 0.6 0.6   ALKPHOS 134* 132*     No results for input(s): INR in the last 72 hours. Recent Labs     09/11/20  0608 09/11/20  1128 09/11/20  1308   CKTOTAL  --  43 101   TROPONINI 0.21* 0.15* 0.21*       Urinalysis:      Lab Results   Component Value Date    NITRU Negative 09/10/2020    WBCUA 3-5 09/10/2020    BACTERIA 2+ 12/30/2010    RBCUA 11-20 09/10/2020    BLOODU LARGE 09/10/2020    SPECGRAV 1.025 09/10/2020    GLUCOSEU Negative 09/10/2020    GLUCOSEU NEGATIVE 12/30/2010       Radiology:  CT HEAD WO CONTRAST   Preliminary Result   1. Malignant right axillary lymph nodes likely from metastatic breast cancer. 2.  No large vessel occlusion in the head or neck. 3. No acute intracranial abnormality. CTA HEAD NECK W CONTRAST   Preliminary Result   1. Malignant right axillary lymph nodes likely from metastatic breast cancer.    2.  No large vessel occlusion in the head or neck. 3. No acute intracranial abnormality. XR CHEST PORTABLE   Final Result   Right perihilar and left basilar opacification, likely atelectasis. Cardiomegaly without overt failure. Assessment/Plan:    Active Hospital Problems    Diagnosis    NSTEMI (non-ST elevated myocardial infarction) ([facility] [address] [I21.4]    Metastatic breast cancer ([facility] [address] [C50.919]    Encephalopathy [G93.40]    PUENTE (nonalcoholic steatohepatitis) [K75.81]    Essential hypertension [I10]    Hypothermia [T68. XXXA]    Skin breakdown [L90.9]       Encephalopathy: Medication related, metastatic/toxic related, elevated ammonia, hypothyroid  Hold gabapentin, Ambien and Norco  CT head neck negative for evidence of intracranial hemorrhage or stroke, lymphadenopathy noted consistent with recent prior studies  TSH ordered  Ammonia normal  Lactic acid 1.5  WBC: 5.6  Anion gap 5, CO2 31     NSTEMI: Troponin 0 0.23, will trend cont to be elevated                  Heparin drip - now dced                  Aspirin                  Consulted cardiology     Hypothermia: ED temperature 95.9,                         Repeat rectal temperatures with bear hugger in place:                                     95.5-95.9                         Continue warming blanket, and monitor                         TSH in process                         No evidence of sepsis     Metastatic breast cancer: We will need to determine if the patient actually does not want any treatment or biopsy. Notes from awais Sheffield indicate that she did not want  A biopsy or treatment. 8/24/2020: CT of the chest indicating a right axillary mass with pulmonary nodules consistent with Metastatic neoplastic disease  8/25/2020: Right upper extremity venous Doppler study: Negative  8/25/2020: CT the abdomen and pelvis negative for any evidence of metastatic process. 8/25/2020: MRI brain negative for evidence of metastatic process or mass.   No evidence of stroke     Rash: Likely yeast             Nystatin powder and intra-dry to the areas under the breast and the                   Groin             Buttocks: Consult wound care     Hypertension: Stable,                            Continue Lasix, losartan, metoprolol     PUENTE: Stable: Bili 0.6, alk phos 134 and AST slightly elevated 45        DVT Prophylaxis: Lovenox  Diet: DIET CLEAR LIQUID;  Dietary Nutrition Supplements: Clear Liquid Oral Supplement  Code Status: DNR-CC    PT/OT Eval Status: not  ordered    Dispo - cont care, hospice consulted    Brittany Sweet MD

## 2020-09-13 NOTE — CARE COORDINATION
To transfer to [facility] inpt unit at [facility] today   Electronically signed by Marietta Bridges RN on 9/13/2020 at 11:55 AM

## 2020-09-14 LAB
BLOOD CULTURE, ROUTINE: NORMAL
CULTURE, BLOOD 2: NORMAL

## 2020-10-15 NOTE — DISCHARGE SUMMARY
Hospital Medicine Discharge Summary    Patient ID: Yury Wu      Patient's PCP: Felix Marsh    Admit Date: 9/10/2020     Discharge Date: 9/13/2020      Admitting Physician: Brenda Morales DO     Discharge Physician: Yolande Hernandez MD     Discharge Diagnoses: Active Hospital Problems    Diagnosis    NSTEMI (non-ST elevated myocardial infarction) ([facility] [address] [I21.4]    Metastatic breast cancer ([facility] [address] [C50.919]    Encephalopathy [G93.40]    PUENTE (nonalcoholic steatohepatitis) [K75.81]    Essential hypertension [I10]    Hypothermia [T68. XXXA]    Skin breakdown [L90.9]       The patient was seen and examined on day of discharge and this discharge summary is in conjunction with any daily progress note from day of discharge. Hospital Course: The patient is a [de-identified] y.o. female who presents to [facility] with AMS per the home health aides. Patient is nonambulatory and has not been ambulatory for years. She receives home health care through [facility]. The patient reportedly  At least/approximately 6 hours of home health care per day by  2 different aids. Information is provided by Cynthia Quinteros, the aide that was present during my exam. [employer] reports the  patient was noted to be a little weak yesterday but otherwise was her baseline. When Cynthia Quinteros called the dayshift aide to see how the patient was doing today, the dayshift aide reported to her that she noticed the patient had some slurred speech this morning and that she was not able to get her up, when they normally are able to get her up to stand for a few moments to clean her.  The aide also notes that the patient has no wound care and they have been trying to manage the skin breakdown on the buttocks and groin area with Desitin and it does not seem to be improving, but only seems to be getting worse.       Encephalopathy: Medication related, metastatic/toxic related, elevated ammonia, hypothyroid  Hold gabapentin, Ambien and Norco  CT head neck negative for evidence of intracranial hemorrhage or stroke, lymphadenopathy noted consistent with recent prior studies  Ammonia normal  Hospice consulted       NSTEMI: Troponin 0 0.23, will trend cont to be elevated                  Heparin drip - now dced                  [facility]                  [facility] cardiology, plan to consult dc with hospice     Hypothermia: ED temperature 95.9,                         Repeat rectal temperatures with bear hugger in place:                                     95.5-95. 9                         Continue warming blanket, and monitor                         TSH in process                         No evidence of sepsis     Metastatic breast cancer: We will need to determine if the patient actually does not want any treatment or biopsy.  Notes from awais Sheffield indicate that she did not want  A biopsy or treatment. 8/24/2020: CT of the chest indicating a right axillary mass with pulmonary nodules consistent with Metastatic neoplastic disease  8/25/2020: Right upper extremity venous Doppler study: Negative  8/25/2020: CT the abdomen and pelvis negative for any evidence of metastatic process. 8/25/2020: MRI brain negative for evidence of metastatic process or mass.  No evidence of stroke     Rash: Likely yeast             Nystatin powder and intra-dry to the areas under the breast and the                   Groin             Buttocks: Consult wound care     Hypertension: Stable,                            Continue Lasix, losartan, metoprolol     PUENTE: Stable: Bili 0.6, alk phos 134 and AST slightly elevated 45       Physical Exam Performed:     BP (!) 103/59   Pulse (!) 49   Temp 94.5 °F (34.7 °C) (Axillary)   Resp 17   Ht 5' 3\" (1.6 m)   Wt 206 lb 2.1 oz (93.5 kg)   SpO2 97%   BMI 36.51 kg/m²     General appearance: awake  Lungs: Clear to auscultation, bilaterally without Rales/Wheezes/Rhonchi with good respiratory effort.   Heart: Regular rate and rhythm with Normal S1/S2  Abdomen: Large with pannus soft, non-tender or non-distended without rigidity or guarding and positive bowel sounds   There is evidence of an erythematous moist patchy blanchable skin excoriated rash under both breasts and around the lower abdominal pannus fold and within the groin bilaterally. Extremities: Right upper extremity edema extending down towards the forearm.  Mild left upper extremity edema also noted. Bilateral lower extremity pedal edema and mild erythema of both ankles and lower legs.  The discoloration of the lower legs is chronic per the patient's aide.  She weakly moves the upper extremities.  And she is able to perform bilateral plantar flexion dorsiflexion.  The aide states that the patient has not been able to raise her legs independently for as long as she is known her.    No clubbing, cyanosis, or edema bilaterally.  Full range of motion without deformity and normal gait intact. Skin: Skin color, texture, turgor normal.  Rash under both breasts, abdominal pannus and skin breakdown stage II with excoriation the buttocks coccyx area.   Neurologic: Alert and oriented to self  No facial drooping., neurovascularly intact with sensory/motor intact upper extremities/lower extremities, bilaterally.  Cranial nerves: II-XII intact, grossly non-focal.  Mental status: Alert  Capillary Refill: Acceptable  < 3 seconds  Peripheral Pulses: +3 Easily felt, not easily obliterated with       Labs:  For convenience and continuity at follow-up the following most recent labs are provided:      CBC:    Lab Results   Component Value Date    WBC 7.2 09/12/2020    HGB 11.4 09/12/2020    HCT 34.1 09/12/2020    PLT 87 09/12/2020       Renal:    Lab Results   Component Value Date     09/11/2020    K 3.9 09/11/2020     09/11/2020    CO2 29 09/11/2020    BUN 14 09/11/2020    CREATININE <0.5 09/11/2020    CALCIUM 8.1 09/11/2020    PHOS 3.6 01/03/2011         Significant Diagnostic Studies    Radiology: CT HEAD WO CONTRAST   Final Result   1. Malignant right axillary lymph nodes likely from metastatic breast cancer. 2.  No large vessel occlusion in the head or neck. 3. No acute intracranial abnormality. CTA HEAD NECK W CONTRAST   Final Result   1. Malignant right axillary lymph nodes likely from metastatic breast cancer. 2.  No large vessel occlusion in the head or neck. 3. No acute intracranial abnormality. XR CHEST PORTABLE   Final Result   Right perihilar and left basilar opacification, likely atelectasis. Cardiomegaly without overt failure. Consults:     IP CONSULT TO STROKE TEAM  IP CONSULT TO SOCIAL WORK  IP CONSULT TO CARDIOLOGY  IP CONSULT TO PALLIATIVE CARE  IP CONSULT TO HOSPICE  IP CONSULT TO SPIRITUAL SERVICES    Disposition:  SNF with hospice     Condition at Discharge: Stable    Discharge Instructions/Follow-up:  PCP in 1 week    Code Status:  Prior     Activity: activity as tolerated    Diet: regular diet      Discharge Medications:     Discharge Medication List as of 9/13/2020  1:53 PM           Details   vitamin B-12 (CYANOCOBALAMIN) 500 MCG tablet Take 500 mcg by mouth dailyHistorical Med      atorvastatin (LIPITOR) 10 MG tablet Take 10 mg by mouth nightlyHistorical Med      folic acid (FOLVITE) 1 MG tablet Take 1 mg by mouth dailyHistorical Med      hydrocodone-acetaminophen (NORCO) 5-325 MG per tablet Take 1 tablet by mouth every 6 hours as needed. Historical Med      gabapentin (NEURONTIN) 300 MG capsule Take 300 mg by mouth 2 times daily. Historical Med      POTASSIUM CHLORIDE PO Take 40 mEq by mouth daily Historical Med      metoprolol (LOPRESSOR) 50 MG tablet Take 50 mg by mouth 2 times daily. Historical Med      losartan (COZAAR) 25 MG tablet Take 25 mg by mouth daily. Historical Med      zolpidem (AMBIEN) 10 MG tablet Take 10 mg by mouth nightly as needed. Historical Med      famotidine (PEPCID) 20 MG tablet Take 20 mg by mouth daily Historical Med furosemide (LASIX) 40 MG tablet Take 40 mg by mouth daily Historical Med      therapeutic multivitamin-minerals (THERAGRAN-M) tablet Take 1 tablet by mouth daily. Historical Med      Polyethyl Glycol-Propyl Glycol (SYSTANE) 0.4-0.3 % SOLN Apply 2 drops to eye as needed Historical Med             Time Spent on discharge is more than 30 minutes in the examination, evaluation, counseling and review of medications and discharge plan. Signed:    Mellisa Kasper MD   10/14/2020      Thank you Marlyn Bernarda for the opportunity to be involved in this patient's care. If you have any questions or concerns please feel free to contact me at [phone].